# Patient Record
Sex: MALE | Race: BLACK OR AFRICAN AMERICAN | Employment: OTHER | URBAN - METROPOLITAN AREA
[De-identification: names, ages, dates, MRNs, and addresses within clinical notes are randomized per-mention and may not be internally consistent; named-entity substitution may affect disease eponyms.]

---

## 2019-02-12 ENCOUNTER — HOSPITAL ENCOUNTER (OUTPATIENT)
Age: 74
Setting detail: OUTPATIENT SURGERY
Discharge: HOME | End: 2019-02-12
Attending: SPECIALIST | Admitting: SPECIALIST
Payer: MEDICARE

## 2019-02-12 ENCOUNTER — ANESTHESIA (OUTPATIENT)
Dept: ENDOSCOPY | Age: 74
End: 2019-02-12
Payer: MEDICARE

## 2019-02-12 ENCOUNTER — ANESTHESIA EVENT (OUTPATIENT)
Dept: ENDOSCOPY | Age: 74
End: 2019-02-12
Payer: MEDICARE

## 2019-02-12 VITALS
BODY MASS INDEX: 24.21 KG/M2 | WEIGHT: 150 LBS | RESPIRATION RATE: 24 BRPM | HEART RATE: 55 BPM | DIASTOLIC BLOOD PRESSURE: 82 MMHG | SYSTOLIC BLOOD PRESSURE: 144 MMHG | TEMPERATURE: 98.2 F | OXYGEN SATURATION: 98 %

## 2019-02-12 PROCEDURE — 76060000032 HC ANESTHESIA 0.5 TO 1 HR: Performed by: SPECIALIST

## 2019-02-12 PROCEDURE — 74011250636 HC RX REV CODE- 250/636

## 2019-02-12 PROCEDURE — 76040000007: Performed by: SPECIALIST

## 2019-02-12 PROCEDURE — 77030027957 HC TBNG IRR ENDOGTR BUSS -B: Performed by: SPECIALIST

## 2019-02-12 RX ORDER — PROPOFOL 10 MG/ML
INJECTION, EMULSION INTRAVENOUS AS NEEDED
Status: DISCONTINUED | OUTPATIENT
Start: 2019-02-12 | End: 2019-02-12 | Stop reason: HOSPADM

## 2019-02-12 RX ORDER — EPINEPHRINE 0.1 MG/ML
1 INJECTION INTRACARDIAC; INTRAVENOUS
Status: DISCONTINUED | OUTPATIENT
Start: 2019-02-12 | End: 2019-02-12 | Stop reason: HOSPADM

## 2019-02-12 RX ORDER — NALOXONE HYDROCHLORIDE 0.4 MG/ML
0.4 INJECTION, SOLUTION INTRAMUSCULAR; INTRAVENOUS; SUBCUTANEOUS
Status: DISCONTINUED | OUTPATIENT
Start: 2019-02-12 | End: 2019-02-12 | Stop reason: HOSPADM

## 2019-02-12 RX ORDER — FENTANYL CITRATE 50 UG/ML
200 INJECTION, SOLUTION INTRAMUSCULAR; INTRAVENOUS
Status: DISCONTINUED | OUTPATIENT
Start: 2019-02-12 | End: 2019-02-12 | Stop reason: HOSPADM

## 2019-02-12 RX ORDER — SODIUM CHLORIDE 0.9 % (FLUSH) 0.9 %
5-40 SYRINGE (ML) INJECTION AS NEEDED
Status: DISCONTINUED | OUTPATIENT
Start: 2019-02-12 | End: 2019-02-12 | Stop reason: HOSPADM

## 2019-02-12 RX ORDER — MIDAZOLAM HYDROCHLORIDE 1 MG/ML
.25-1 INJECTION, SOLUTION INTRAMUSCULAR; INTRAVENOUS
Status: DISCONTINUED | OUTPATIENT
Start: 2019-02-12 | End: 2019-02-12 | Stop reason: HOSPADM

## 2019-02-12 RX ORDER — FLUMAZENIL 0.1 MG/ML
0.2 INJECTION INTRAVENOUS
Status: DISCONTINUED | OUTPATIENT
Start: 2019-02-12 | End: 2019-02-12 | Stop reason: HOSPADM

## 2019-02-12 RX ORDER — DEXTROMETHORPHAN/PSEUDOEPHED 2.5-7.5/.8
1.2 DROPS ORAL
Status: DISCONTINUED | OUTPATIENT
Start: 2019-02-12 | End: 2019-02-12 | Stop reason: HOSPADM

## 2019-02-12 RX ORDER — SODIUM CHLORIDE 0.9 % (FLUSH) 0.9 %
5-40 SYRINGE (ML) INJECTION EVERY 8 HOURS
Status: DISCONTINUED | OUTPATIENT
Start: 2019-02-12 | End: 2019-02-12 | Stop reason: HOSPADM

## 2019-02-12 RX ORDER — SODIUM CHLORIDE 9 MG/ML
INJECTION, SOLUTION INTRAVENOUS
Status: DISCONTINUED | OUTPATIENT
Start: 2019-02-12 | End: 2019-02-12 | Stop reason: HOSPADM

## 2019-02-12 RX ORDER — LIDOCAINE HYDROCHLORIDE 20 MG/ML
INJECTION, SOLUTION INFILTRATION; PERINEURAL AS NEEDED
Status: DISCONTINUED | OUTPATIENT
Start: 2019-02-12 | End: 2019-02-12 | Stop reason: HOSPADM

## 2019-02-12 RX ORDER — ATROPINE SULFATE 0.1 MG/ML
0.5 INJECTION INTRAVENOUS
Status: DISCONTINUED | OUTPATIENT
Start: 2019-02-12 | End: 2019-02-12 | Stop reason: HOSPADM

## 2019-02-12 RX ORDER — SODIUM CHLORIDE 9 MG/ML
50 INJECTION, SOLUTION INTRAVENOUS CONTINUOUS
Status: DISCONTINUED | OUTPATIENT
Start: 2019-02-12 | End: 2019-02-12 | Stop reason: HOSPADM

## 2019-02-12 RX ADMIN — PROPOFOL 20 MG: 10 INJECTION, EMULSION INTRAVENOUS at 09:48

## 2019-02-12 RX ADMIN — PROPOFOL 50 MG: 10 INJECTION, EMULSION INTRAVENOUS at 09:40

## 2019-02-12 RX ADMIN — PROPOFOL 20 MG: 10 INJECTION, EMULSION INTRAVENOUS at 09:52

## 2019-02-12 RX ADMIN — LIDOCAINE HYDROCHLORIDE 60 MG: 20 INJECTION, SOLUTION INFILTRATION; PERINEURAL at 09:40

## 2019-02-12 RX ADMIN — PROPOFOL 20 MG: 10 INJECTION, EMULSION INTRAVENOUS at 09:56

## 2019-02-12 RX ADMIN — PROPOFOL 20 MG: 10 INJECTION, EMULSION INTRAVENOUS at 09:57

## 2019-02-12 RX ADMIN — PROPOFOL 30 MG: 10 INJECTION, EMULSION INTRAVENOUS at 09:43

## 2019-02-12 RX ADMIN — PROPOFOL 30 MG: 10 INJECTION, EMULSION INTRAVENOUS at 09:44

## 2019-02-12 RX ADMIN — SODIUM CHLORIDE: 9 INJECTION, SOLUTION INTRAVENOUS at 09:26

## 2019-02-12 RX ADMIN — PROPOFOL 50 MG: 10 INJECTION, EMULSION INTRAVENOUS at 09:41

## 2019-02-12 RX ADMIN — PROPOFOL 20 MG: 10 INJECTION, EMULSION INTRAVENOUS at 09:51

## 2019-02-12 RX ADMIN — PROPOFOL 20 MG: 10 INJECTION, EMULSION INTRAVENOUS at 09:53

## 2019-02-12 RX ADMIN — PROPOFOL 20 MG: 10 INJECTION, EMULSION INTRAVENOUS at 09:47

## 2019-02-12 RX ADMIN — PROPOFOL 50 MG: 10 INJECTION, EMULSION INTRAVENOUS at 09:46

## 2019-02-12 RX ADMIN — PROPOFOL 20 MG: 10 INJECTION, EMULSION INTRAVENOUS at 09:50

## 2019-02-12 RX ADMIN — PROPOFOL 20 MG: 10 INJECTION, EMULSION INTRAVENOUS at 09:49

## 2019-02-12 RX ADMIN — PROPOFOL 50 MG: 10 INJECTION, EMULSION INTRAVENOUS at 09:42

## 2019-02-12 NOTE — DISCHARGE INSTRUCTIONS
[facility]  [id number]  1945    COLON DISCHARGE INSTRUCTIONS  Discomfort:  Redness at IV site- apply warm compress to area; if redness or soreness persist- contact your physician  There may be a slight amount of blood passed from the rectum  Gaseous discomfort- walking, belching will help relieve any discomfort  You may not operate a vehicle for 12 hours  You may not engage in an occupation involving machinery or appliances for rest of today  You may not drink alcoholic beverages for at least 12 hours  Avoid making any critical decisions for at least 24 hour  DIET:   High fiber diet. - however -  remember your colon is empty and a heavy meal will produce gas. Avoid these foods:  vegetables, fried / greasy foods, carbonated drinks for today. MEDICATIONS:      Regarding Aspirin or Nonsteroidal medications, please see below. ACTIVITY:  You may resume your normal daily activities it is recommended that you spend the remainder of the day resting -  avoid any strenuous activity. CALL M.D. ANY SIGN OF:  Increasing pain, nausea, vomiting  Abdominal distension (swelling)  New increased bleeding (oral or rectal)  Fever (chills)  Pain in chest area  Bloody discharge from nose or mouth  Shortness of breath    You may  take any Advil, Aspirin, Ibuprofen, Motrin, Aleve, or Goodys for 10 days, ONLY  Tylenol as needed for pain.       Follow-up Instructions:   Call Dr. Steven Martinez office if you have blood in stools or severe pain    Telephone #  [phone]      DISCHARGE SUMMARY from Nurse    The following personal items collected during your admission are returned to you:   Dental Appliance: Dental Appliances: None  Vision: Visual Aid: Glasses  Hearing Aid:    Jewelry:    Clothing:    Other Valuables:    Valuables sent to safe:

## 2019-02-12 NOTE — ANESTHESIA PREPROCEDURE EVALUATION
Anesthetic History No history of anesthetic complications Review of Systems / Medical History Patient summary reviewed, nursing notes reviewed and pertinent labs reviewed Pulmonary Within defined limits Neuro/Psych Within defined limits Cardiovascular Hypertension CAD and CABG 
 
 
  
GI/Hepatic/Renal 
  
 
 
 
 
 
Comments: Hx polyps Endo/Other Other Findings Physical Exam 
 
Airway Mallampati: I 
TM Distance: > 6 cm Neck ROM: normal range of motion Mouth opening: Normal 
 
 Cardiovascular Rhythm: regular Rate: normal 
 
 
 
 Dental 
No notable dental hx Pulmonary Breath sounds clear to auscultation Abdominal 
 
 
 
 Other Findings Anesthetic Plan ASA: 3 Anesthesia type: MAC Induction: Intravenous Anesthetic plan and risks discussed with: Patient

## 2019-02-12 NOTE — H&P
Colonoscopy History and Physical      The patient was seen and examined. Date of last colonoscopy: 2015, Polyps  Yes      The airway was assessed and documented. The problem list, past medical history, and medications were reviewed. There is no problem list on file for this patient. Social History     Socioeconomic History    Marital status:      Spouse name: Not on file    Number of children: Not on file    Years of education: Not on file    Highest education level: Not on file   Social Needs    Financial resource strain: Not on file    Food insecurity - worry: Not on file    Food insecurity - inability: Not on file    Transportation needs - medical: Not on file   Yozio needs - non-medical: Not on file   Occupational History    Not on file   Tobacco Use    Smoking status: Former Smoker     Years: 25.00     Last attempt to quit: 2/12/2016     Years since quitting: 3.0    Tobacco comment: quit smoking cigarettes 1 1/2 yrs ago   Substance and Sexual Activity    Alcohol use: No    Drug use: No    Sexual activity: Not on file   Other Topics Concern    Not on file   Social History Narrative    Not on file     Past Medical History:   Diagnosis Date    CAD (coronary artery disease)     [facility] [address] [facility]     Heart attack ([facility]     Hypertension          Prior to Admission Medications   Prescriptions Last Dose Informant Patient Reported? Taking? OTHER 2/12/2019 at Unknown time  Yes Yes   aspirin 81 mg tablet 2/11/2019 at Unknown time  Yes Yes   Sig: Take 81 mg by mouth.   metoprolol (LOPRESSOR) 50 mg tablet 2/11/2019 at Unknown time  Yes Yes   Sig: Take 50 mg by mouth two (2) times a day. pravastatin (PRAVACHOL) 10 mg tablet 2/11/2019 at Unknown time  Yes Yes   Sig: Take 40 mg by mouth nightly. Facility-Administered Medications: None       The patient was seen and examined in the endoscopy suite. The airway was assessed and docuemented.  The problem list and medications were reviewed. Chief complaint, history of present illness, and review of systems and Past medical History are positive for: Colon polyps, CAD and COPD    The heart, lungs and mental status were satisfactory for the administration of sedation and for the procedure. I discussed with the patient the objectives, risks, consequences and alternatives to the procedure.      Plan: Endoscopic procedure with sedation     Brittani Mata MD   2/12/2019  9:34 AM

## 2019-02-12 NOTE — PERIOP NOTES

## 2019-02-12 NOTE — PROCEDURES
[address]  [address]                 Colonoscopy Procedure Note    Indications:   See Preoperative Diagnosis above  Referring Physician: Curtis Eason MD  Anesthesia/Sedation: MAC anesthesia Propofol  Endoscopist:  Dr. Laya Nelson  Assistant:  Endoscopy Technician-1: Suly Luevano  Endoscopy RN-1: Apryl Mahan RN  Preoperative diagnosis: PERSONAL HX OF COLONIC POLYPS  Postoperative diagnosis: Diverticulosis    Procedure in Detail:  Informed consent was obtained for the procedure, including sedation. Risks of perforation, hemorrhage, adverse drug reaction, and aspiration were discussed. The patient was placed in the left lateral decubitus position. Based on the pre-procedure assessment, including review of the patient's medical history, medications, allergies, and review of systems, he had been deemed to be an appropriate candidate for moderate sedation; he was therefore sedated with the medications listed above. The patient was monitored continuously with ECG tracing, pulse oximetry, blood pressure monitoring, and direct observations. A rectal examination was performed. The [id number] was inserted into the rectum and advanced under direct vision to the cecum, which was identified by the ileocecal valve and appendiceal orifice. The quality of the colonic preparation was fair. A careful inspection was made as the colonoscope was withdrawn, including a retroflexed view of the rectum; findings and interventions are described below. Appropriate photodocumentation was obtained. Findings:  Rectum: normal  Sigmoid: moderate diverticulosis; Descending Colon: moderate diverticulosis;  Transverse Colon: normal  Ascending Colon: normal  Cecum: normal      Specimens:    none    EBL: None    Complications: None; patient tolerated the procedure well. Recommendations:     - Repeat colonoscopy in 5 years. - High fiber diet.       Signed By: Angelita Moreno Gris Aguilera MD                        February 12, 2019

## 2019-02-12 NOTE — PROGRESS NOTES
Ana Velasquez  1945  [id number]    Situation:  Verbal report received from: Elbanat Alberto  Procedure: Procedure(s):  COLONOSCOPY    Background:    Preoperative diagnosis: PERSONAL HX OF COLONIC POLYPS  Postoperative diagnosis: Diverticulosis    :  Dr. Juju Serrano  Assistant(s): Endoscopy Technician-1: Patricia King  Endoscopy RN-1: Jerad Chapman RN    Specimens: * No specimens in log *  H. Pylori  no    Assessment:  Intra-procedure medications   Anesthesia gave intra-procedure sedation and medications, see anesthesia flow sheet yes    Intravenous fluids: NS@ KVO     Vital signs stable     Abdominal assessment: round and soft     Recommendation:  Discharge patient per MD order    Family or Friend   Permission to share finding with family or friend yes

## 2019-03-20 NOTE — ANESTHESIA POSTPROCEDURE EVALUATION
Post-Anesthesia Evaluation and Assessment Patient: Jeannette Gallegos MRN: [id number]  SSN: [id number] YOB: 1945  Age: 75 y.o. Sex: male I have evaluated the patient and they are stable and ready for discharge from the PACU. Cardiovascular Function/Vital Signs Visit Vitals /82 Pulse (!) 55 Temp 36.8 °C (98.2 °F) Resp 24 Wt 68 kg (150 lb) SpO2 98% BMI 24.21 kg/m² Patient is status post MAC anesthesia for Procedure(s): 
COLONOSCOPY. Nausea/Vomiting: None Postoperative hydration reviewed and adequate. Pain: 
Pain Scale 1: Visual (02/12/19 1017) Pain Intensity 1: 0 (02/12/19 0925) Managed Neurological Status: At baseline Mental Status, Level of Consciousness: Alert and  oriented to person, place, and time Pulmonary Status:  
O2 Device: Room air (02/12/19 1015) Adequate oxygenation and airway patent Complications related to anesthesia: None Post-anesthesia assessment completed. No concerns Signed By: Tresa Jordan MD   
 February 12, 2019 Procedure(s): 
COLONOSCOPY. 
 
<BSHSIANPOST> Visit Vitals /82 Pulse (!) 55 Temp 36.8 °C (98.2 °F) Resp 24 Wt 68 kg (150 lb) SpO2 98% BMI 24.21 kg/m² None

## 2019-08-29 ENCOUNTER — HOSPITAL ENCOUNTER (EMERGENCY)
Age: 74
Discharge: HOME | End: 2019-08-29
Attending: EMERGENCY MEDICINE
Payer: MEDICARE

## 2019-08-29 VITALS
BODY MASS INDEX: 22.68 KG/M2 | RESPIRATION RATE: 16 BRPM | HEIGHT: 66 IN | WEIGHT: 141.09 LBS | DIASTOLIC BLOOD PRESSURE: 98 MMHG | TEMPERATURE: 98.2 F | SYSTOLIC BLOOD PRESSURE: 156 MMHG | HEART RATE: 57 BPM | OXYGEN SATURATION: 98 %

## 2019-08-29 DIAGNOSIS — T63.441A BEE STING REACTION, ACCIDENTAL OR UNINTENTIONAL, INITIAL ENCOUNTER: Primary | ICD-10-CM

## 2019-08-29 PROCEDURE — 74011250637 HC RX REV CODE- 250/637: Performed by: PHYSICIAN ASSISTANT

## 2019-08-29 PROCEDURE — 74011636637 HC RX REV CODE- 636/637: Performed by: PHYSICIAN ASSISTANT

## 2019-08-29 PROCEDURE — 99284 EMERGENCY DEPT VISIT MOD MDM: CPT

## 2019-08-29 RX ORDER — IBUPROFEN 600 MG/1
600 TABLET ORAL
Status: COMPLETED | OUTPATIENT
Start: 2019-08-29 | End: 2019-08-29

## 2019-08-29 RX ORDER — CETIRIZINE HCL 10 MG
10 TABLET ORAL
Status: COMPLETED | OUTPATIENT
Start: 2019-08-29 | End: 2019-08-29

## 2019-08-29 RX ORDER — IBUPROFEN 600 MG/1
600 TABLET ORAL
Qty: 20 TAB | Refills: 0 | Status: SHIPPED | OUTPATIENT
Start: 2019-08-29 | End: 2021-10-24

## 2019-08-29 RX ORDER — PREDNISONE 20 MG/1
60 TABLET ORAL
Status: COMPLETED | OUTPATIENT
Start: 2019-08-29 | End: 2019-08-29

## 2019-08-29 RX ORDER — CETIRIZINE HCL 10 MG
10 TABLET ORAL DAILY
Qty: 10 TAB | Refills: 0 | Status: SHIPPED | OUTPATIENT
Start: 2019-08-29 | End: 2021-10-25

## 2019-08-29 RX ORDER — PREDNISONE 10 MG/1
TABLET ORAL
Qty: 21 TAB | Refills: 0 | Status: SHIPPED | OUTPATIENT
Start: 2019-08-29 | End: 2021-10-24

## 2019-08-29 RX ADMIN — CETIRIZINE HYDROCHLORIDE 10 MG: 10 TABLET, FILM COATED ORAL at 21:57

## 2019-08-29 RX ADMIN — PREDNISONE 60 MG: 20 TABLET ORAL at 21:57

## 2019-08-29 RX ADMIN — IBUPROFEN 600 MG: 600 TABLET, FILM COATED ORAL at 21:57

## 2019-08-29 NOTE — LETTER
[address] 
[facility] EMERGENCY DEPT 
[address] [place] [address] Aleda Primrose [id number] [phone] Work/School Note Date: 8/29/2019 To Whom It May concern: 
 
Bia Staley was seen and treated today in the emergency room by the following provider(s): 
Attending Provider: Delvis Mark MD 
Physician Assistant: ADAM Torres. Bia Staley may return to work on [id number]. Sincerely, ADAM Weber
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
## 2021-10-24 ENCOUNTER — APPOINTMENT (OUTPATIENT)
Dept: GENERAL RADIOLOGY | Age: 76
DRG: 193 | End: 2021-10-24
Attending: EMERGENCY MEDICINE
Payer: MEDICARE

## 2021-10-24 ENCOUNTER — HOSPITAL ENCOUNTER (INPATIENT)
Age: 76
LOS: 3 days | Discharge: HOME | DRG: 193 | End: 2021-10-28
Attending: STUDENT IN AN ORGANIZED HEALTH CARE EDUCATION/TRAINING PROGRAM | Admitting: INTERNAL MEDICINE
Payer: MEDICARE

## 2021-10-24 DIAGNOSIS — J12.9 VIRAL PNEUMONIA: ICD-10-CM

## 2021-10-24 DIAGNOSIS — J44.1 COPD EXACERBATION (HCC): Primary | ICD-10-CM

## 2021-10-24 LAB
COMMENT, HOLDF: NORMAL
SAMPLES BEING HELD,HOLD: NORMAL

## 2021-10-24 PROCEDURE — 36415 COLL VENOUS BLD VENIPUNCTURE: CPT

## 2021-10-24 PROCEDURE — 71046 X-RAY EXAM CHEST 2 VIEWS: CPT

## 2021-10-24 PROCEDURE — 94640 AIRWAY INHALATION TREATMENT: CPT

## 2021-10-24 PROCEDURE — 96375 TX/PRO/DX INJ NEW DRUG ADDON: CPT

## 2021-10-24 PROCEDURE — 74011000250 HC RX REV CODE- 250: Performed by: STUDENT IN AN ORGANIZED HEALTH CARE EDUCATION/TRAINING PROGRAM

## 2021-10-24 PROCEDURE — 80053 COMPREHEN METABOLIC PANEL: CPT

## 2021-10-24 PROCEDURE — 83880 ASSAY OF NATRIURETIC PEPTIDE: CPT

## 2021-10-24 PROCEDURE — 82550 ASSAY OF CK (CPK): CPT

## 2021-10-24 PROCEDURE — 85025 COMPLETE CBC W/AUTO DIFF WBC: CPT

## 2021-10-24 PROCEDURE — 99285 EMERGENCY DEPT VISIT HI MDM: CPT

## 2021-10-24 PROCEDURE — 74011250636 HC RX REV CODE- 250/636: Performed by: STUDENT IN AN ORGANIZED HEALTH CARE EDUCATION/TRAINING PROGRAM

## 2021-10-24 PROCEDURE — 82553 CREATINE MB FRACTION: CPT

## 2021-10-24 PROCEDURE — 93005 ELECTROCARDIOGRAM TRACING: CPT

## 2021-10-24 PROCEDURE — 84484 ASSAY OF TROPONIN QUANT: CPT

## 2021-10-24 RX ORDER — TERAZOSIN 5 MG/1
5 CAPSULE ORAL
COMMUNITY

## 2021-10-24 RX ORDER — HYDRALAZINE HYDROCHLORIDE 25 MG/1
25 TABLET, FILM COATED ORAL 2 TIMES DAILY
Status: ON HOLD | COMMUNITY
End: 2021-10-28 | Stop reason: SDUPTHER

## 2021-10-24 RX ORDER — IPRATROPIUM BROMIDE AND ALBUTEROL SULFATE 2.5; .5 MG/3ML; MG/3ML
3 SOLUTION RESPIRATORY (INHALATION)
Status: COMPLETED | OUTPATIENT
Start: 2021-10-24 | End: 2021-10-25

## 2021-10-24 RX ADMIN — IPRATROPIUM BROMIDE AND ALBUTEROL SULFATE 3 ML: .5; 3 SOLUTION RESPIRATORY (INHALATION) at 23:53

## 2021-10-24 RX ADMIN — METHYLPREDNISOLONE SODIUM SUCCINATE 125 MG: 125 INJECTION, POWDER, FOR SOLUTION INTRAMUSCULAR; INTRAVENOUS at 23:57

## 2021-10-24 NOTE — Clinical Note
Patient Class[de-identified] OBSERVATION [104]   Type of Bed: Medical [8]   Cardiac Monitoring Required?: No   Reason for Observation: COPD exa   Admitting Diagnosis: [facility] exacerbation ([facility] [2370235]   Admitting Diagnosis: Acute respiratory failure with hypoxia [facility]) [[id number]]   Admitting Physician: Deep Choi   Attending Physician: Deep Miller [[id number]]

## 2021-10-25 PROBLEM — J44.1 COPD EXACERBATION (HCC): Status: ACTIVE | Noted: 2021-10-25

## 2021-10-25 PROBLEM — J96.01 ACUTE RESPIRATORY FAILURE WITH HYPOXIA (HCC): Status: ACTIVE | Noted: 2021-10-25

## 2021-10-25 PROBLEM — R09.02 HYPOXIA: Status: ACTIVE | Noted: 2021-10-25

## 2021-10-25 LAB
ALBUMIN SERPL-MCNC: 3 G/DL (ref 3.5–5)
ALBUMIN SERPL-MCNC: 3 G/DL (ref 3.5–5)
ALBUMIN/GLOB SERPL: 0.6 {RATIO} (ref 1.1–2.2)
ALBUMIN/GLOB SERPL: 0.7 {RATIO} (ref 1.1–2.2)
ALP SERPL-CCNC: 65 U/L (ref 45–117)
ALP SERPL-CCNC: 70 U/L (ref 45–117)
ALT SERPL-CCNC: 20 U/L (ref 12–78)
ALT SERPL-CCNC: 20 U/L (ref 12–78)
ANION GAP SERPL CALC-SCNC: 1 MMOL/L (ref 5–15)
ANION GAP SERPL CALC-SCNC: 2 MMOL/L (ref 5–15)
AST SERPL-CCNC: 25 U/L (ref 15–37)
AST SERPL-CCNC: 28 U/L (ref 15–37)
ATRIAL RATE: 60 BPM
B PERT DNA SPEC QL NAA+PROBE: NOT DETECTED
BASOPHILS # BLD: 0 K/UL (ref 0–0.1)
BASOPHILS # BLD: 0 K/UL (ref 0–0.1)
BASOPHILS NFR BLD: 0 % (ref 0–1)
BASOPHILS NFR BLD: 1 % (ref 0–1)
BILIRUB SERPL-MCNC: 0.4 MG/DL (ref 0.2–1)
BILIRUB SERPL-MCNC: 0.4 MG/DL (ref 0.2–1)
BNP SERPL-MCNC: 2176 PG/ML
BORDETELLA PARAPERTUSSIS PCR, BORPAR: NOT DETECTED
BUN SERPL-MCNC: 12 MG/DL (ref 6–20)
BUN SERPL-MCNC: 14 MG/DL (ref 6–20)
BUN/CREAT SERPL: 11 (ref 12–20)
BUN/CREAT SERPL: 12 (ref 12–20)
C PNEUM DNA SPEC QL NAA+PROBE: NOT DETECTED
CALCIUM SERPL-MCNC: 8.5 MG/DL (ref 8.5–10.1)
CALCIUM SERPL-MCNC: 8.7 MG/DL (ref 8.5–10.1)
CALCULATED P AXIS, ECG09: 38 DEGREES
CALCULATED R AXIS, ECG10: 6 DEGREES
CALCULATED T AXIS, ECG11: 70 DEGREES
CHLORIDE SERPL-SCNC: 108 MMOL/L (ref 97–108)
CHLORIDE SERPL-SCNC: 108 MMOL/L (ref 97–108)
CK MB CFR SERPL CALC: 1.4 % (ref 0–2.5)
CK MB SERPL-MCNC: 4.8 NG/ML (ref 5–25)
CK SERPL-CCNC: 334 U/L (ref 39–308)
CO2 SERPL-SCNC: 25 MMOL/L (ref 21–32)
CO2 SERPL-SCNC: 29 MMOL/L (ref 21–32)
COVID-19 RAPID TEST, COVR: NOT DETECTED
CREAT SERPL-MCNC: 1.06 MG/DL (ref 0.7–1.3)
CREAT SERPL-MCNC: 1.13 MG/DL (ref 0.7–1.3)
DIAGNOSIS, 93000: NORMAL
DIFFERENTIAL METHOD BLD: ABNORMAL
DIFFERENTIAL METHOD BLD: ABNORMAL
EOSINOPHIL # BLD: 0 K/UL (ref 0–0.4)
EOSINOPHIL # BLD: 0.1 K/UL (ref 0–0.4)
EOSINOPHIL NFR BLD: 0 % (ref 0–7)
EOSINOPHIL NFR BLD: 2 % (ref 0–7)
ERYTHROCYTE [DISTWIDTH] IN BLOOD BY AUTOMATED COUNT: 14.3 % (ref 11.5–14.5)
ERYTHROCYTE [DISTWIDTH] IN BLOOD BY AUTOMATED COUNT: 14.5 % (ref 11.5–14.5)
FLUAV H1 2009 PAND RNA SPEC QL NAA+PROBE: NOT DETECTED
FLUAV H1 RNA SPEC QL NAA+PROBE: NOT DETECTED
FLUAV H3 RNA SPEC QL NAA+PROBE: NOT DETECTED
FLUAV SUBTYP SPEC NAA+PROBE: NOT DETECTED
FLUBV RNA SPEC QL NAA+PROBE: NOT DETECTED
GLOBULIN SER CALC-MCNC: 4.6 G/DL (ref 2–4)
GLOBULIN SER CALC-MCNC: 5 G/DL (ref 2–4)
GLUCOSE SERPL-MCNC: 128 MG/DL (ref 65–100)
GLUCOSE SERPL-MCNC: 95 MG/DL (ref 65–100)
HADV DNA SPEC QL NAA+PROBE: NOT DETECTED
HCOV 229E RNA SPEC QL NAA+PROBE: NOT DETECTED
HCOV HKU1 RNA SPEC QL NAA+PROBE: NOT DETECTED
HCOV NL63 RNA SPEC QL NAA+PROBE: NOT DETECTED
HCOV OC43 RNA SPEC QL NAA+PROBE: NOT DETECTED
HCT VFR BLD AUTO: 38.7 % (ref 36.6–50.3)
HCT VFR BLD AUTO: 38.9 % (ref 36.6–50.3)
HGB BLD-MCNC: 12.1 G/DL (ref 12.1–17)
HGB BLD-MCNC: 12.2 G/DL (ref 12.1–17)
HMPV RNA SPEC QL NAA+PROBE: NOT DETECTED
HPIV1 RNA SPEC QL NAA+PROBE: NOT DETECTED
HPIV2 RNA SPEC QL NAA+PROBE: NOT DETECTED
HPIV3 RNA SPEC QL NAA+PROBE: NOT DETECTED
HPIV4 RNA SPEC QL NAA+PROBE: NOT DETECTED
IMM GRANULOCYTES # BLD AUTO: 0 K/UL (ref 0–0.04)
IMM GRANULOCYTES # BLD AUTO: 0 K/UL (ref 0–0.04)
IMM GRANULOCYTES NFR BLD AUTO: 0 % (ref 0–0.5)
IMM GRANULOCYTES NFR BLD AUTO: 0 % (ref 0–0.5)
LYMPHOCYTES # BLD: 0.4 K/UL (ref 0.8–3.5)
LYMPHOCYTES # BLD: 1.2 K/UL (ref 0.8–3.5)
LYMPHOCYTES NFR BLD: 12 % (ref 12–49)
LYMPHOCYTES NFR BLD: 29 % (ref 12–49)
M PNEUMO DNA SPEC QL NAA+PROBE: NOT DETECTED
MCH RBC QN AUTO: 30 PG (ref 26–34)
MCH RBC QN AUTO: 30.7 PG (ref 26–34)
MCHC RBC AUTO-ENTMCNC: 31.1 G/DL (ref 30–36.5)
MCHC RBC AUTO-ENTMCNC: 31.5 G/DL (ref 30–36.5)
MCV RBC AUTO: 96.5 FL (ref 80–99)
MCV RBC AUTO: 97.2 FL (ref 80–99)
MONOCYTES # BLD: 0 K/UL (ref 0–1)
MONOCYTES # BLD: 0.3 K/UL (ref 0–1)
MONOCYTES NFR BLD: 1 % (ref 5–13)
MONOCYTES NFR BLD: 7 % (ref 5–13)
NEUTS SEG # BLD: 2.4 K/UL (ref 1.8–8)
NEUTS SEG # BLD: 2.9 K/UL (ref 1.8–8)
NEUTS SEG NFR BLD: 61 % (ref 32–75)
NEUTS SEG NFR BLD: 87 % (ref 32–75)
NRBC # BLD: 0 K/UL (ref 0–0.01)
NRBC # BLD: 0 K/UL (ref 0–0.01)
NRBC BLD-RTO: 0 PER 100 WBC
NRBC BLD-RTO: 0 PER 100 WBC
P-R INTERVAL, ECG05: 182 MS
PLATELET # BLD AUTO: 89 K/UL (ref 150–400)
PLATELET # BLD AUTO: 93 K/UL (ref 150–400)
PMV BLD AUTO: 10.1 FL (ref 8.9–12.9)
PMV BLD AUTO: 10.1 FL (ref 8.9–12.9)
POTASSIUM SERPL-SCNC: 3.9 MMOL/L (ref 3.5–5.1)
POTASSIUM SERPL-SCNC: 4.3 MMOL/L (ref 3.5–5.1)
PROT SERPL-MCNC: 7.6 G/DL (ref 6.4–8.2)
PROT SERPL-MCNC: 8 G/DL (ref 6.4–8.2)
Q-T INTERVAL, ECG07: 444 MS
QRS DURATION, ECG06: 92 MS
QTC CALCULATION (BEZET), ECG08: 444 MS
RBC # BLD AUTO: 3.98 M/UL (ref 4.1–5.7)
RBC # BLD AUTO: 4.03 M/UL (ref 4.1–5.7)
RBC MORPH BLD: ABNORMAL
RSV RNA SPEC QL NAA+PROBE: NOT DETECTED
RV+EV RNA SPEC QL NAA+PROBE: DETECTED
SARS-COV-2 PCR, COVPCR: NOT DETECTED
SODIUM SERPL-SCNC: 135 MMOL/L (ref 136–145)
SODIUM SERPL-SCNC: 138 MMOL/L (ref 136–145)
SOURCE, COVRS: NORMAL
TROPONIN-HIGH SENSITIVITY: 19 NG/L (ref 0–76)
VENTRICULAR RATE, ECG03: 60 BPM
WBC # BLD AUTO: 3.3 K/UL (ref 4.1–11.1)
WBC # BLD AUTO: 4 K/UL (ref 4.1–11.1)

## 2021-10-25 PROCEDURE — 36415 COLL VENOUS BLD VENIPUNCTURE: CPT

## 2021-10-25 PROCEDURE — 74011000250 HC RX REV CODE- 250: Performed by: GENERAL ACUTE CARE HOSPITAL

## 2021-10-25 PROCEDURE — 87635 SARS-COV-2 COVID-19 AMP PRB: CPT

## 2021-10-25 PROCEDURE — 94640 AIRWAY INHALATION TREATMENT: CPT

## 2021-10-25 PROCEDURE — 80053 COMPREHEN METABOLIC PANEL: CPT

## 2021-10-25 PROCEDURE — 65660000000 HC RM CCU STEPDOWN

## 2021-10-25 PROCEDURE — 74011000258 HC RX REV CODE- 258: Performed by: GENERAL ACUTE CARE HOSPITAL

## 2021-10-25 PROCEDURE — 74011250637 HC RX REV CODE- 250/637: Performed by: INTERNAL MEDICINE

## 2021-10-25 PROCEDURE — 87449 NOS EACH ORGANISM AG IA: CPT

## 2021-10-25 PROCEDURE — 0202U NFCT DS 22 TRGT SARS-COV-2: CPT

## 2021-10-25 PROCEDURE — 96365 THER/PROPH/DIAG IV INF INIT: CPT

## 2021-10-25 PROCEDURE — 74011250636 HC RX REV CODE- 250/636: Performed by: GENERAL ACUTE CARE HOSPITAL

## 2021-10-25 PROCEDURE — 74011250637 HC RX REV CODE- 250/637: Performed by: NURSE PRACTITIONER

## 2021-10-25 PROCEDURE — 85025 COMPLETE CBC W/AUTO DIFF WBC: CPT

## 2021-10-25 PROCEDURE — 96372 THER/PROPH/DIAG INJ SC/IM: CPT

## 2021-10-25 PROCEDURE — 74011250636 HC RX REV CODE- 250/636: Performed by: STUDENT IN AN ORGANIZED HEALTH CARE EDUCATION/TRAINING PROGRAM

## 2021-10-25 PROCEDURE — 99218 HC RM OBSERVATION: CPT

## 2021-10-25 PROCEDURE — 74011000250 HC RX REV CODE- 250: Performed by: STUDENT IN AN ORGANIZED HEALTH CARE EDUCATION/TRAINING PROGRAM

## 2021-10-25 PROCEDURE — 74011250637 HC RX REV CODE- 250/637: Performed by: GENERAL ACUTE CARE HOSPITAL

## 2021-10-25 RX ORDER — METOPROLOL TARTRATE 25 MG/1
25 TABLET, FILM COATED ORAL 2 TIMES DAILY
Status: DISCONTINUED | OUTPATIENT
Start: 2021-10-25 | End: 2021-10-28 | Stop reason: HOSPADM

## 2021-10-25 RX ORDER — PRAVASTATIN SODIUM 40 MG/1
40 TABLET ORAL
Status: DISCONTINUED | OUTPATIENT
Start: 2021-10-25 | End: 2021-10-28 | Stop reason: HOSPADM

## 2021-10-25 RX ORDER — PREDNISONE 20 MG/1
40 TABLET ORAL 2 TIMES DAILY WITH MEALS
Status: DISCONTINUED | OUTPATIENT
Start: 2021-10-25 | End: 2021-10-25

## 2021-10-25 RX ORDER — ACETAMINOPHEN 325 MG/1
650 TABLET ORAL
Status: DISCONTINUED | OUTPATIENT
Start: 2021-10-25 | End: 2021-10-28 | Stop reason: HOSPADM

## 2021-10-25 RX ORDER — TERAZOSIN 5 MG/1
5 CAPSULE ORAL
Status: DISCONTINUED | OUTPATIENT
Start: 2021-10-25 | End: 2021-10-28 | Stop reason: HOSPADM

## 2021-10-25 RX ORDER — CETIRIZINE HCL 10 MG
10 TABLET ORAL DAILY
Status: DISCONTINUED | OUTPATIENT
Start: 2021-10-25 | End: 2021-10-28 | Stop reason: HOSPADM

## 2021-10-25 RX ORDER — ACETAMINOPHEN 650 MG/1
650 SUPPOSITORY RECTAL
Status: DISCONTINUED | OUTPATIENT
Start: 2021-10-25 | End: 2021-10-28 | Stop reason: HOSPADM

## 2021-10-25 RX ORDER — ALBUTEROL SULFATE 90 UG/1
2 AEROSOL, METERED RESPIRATORY (INHALATION)
COMMUNITY

## 2021-10-25 RX ORDER — SODIUM CHLORIDE 0.9 % (FLUSH) 0.9 %
5-40 SYRINGE (ML) INJECTION AS NEEDED
Status: DISCONTINUED | OUTPATIENT
Start: 2021-10-25 | End: 2021-10-28 | Stop reason: HOSPADM

## 2021-10-25 RX ORDER — ENOXAPARIN SODIUM 100 MG/ML
40 INJECTION SUBCUTANEOUS DAILY
Status: DISCONTINUED | OUTPATIENT
Start: 2021-10-25 | End: 2021-10-28 | Stop reason: HOSPADM

## 2021-10-25 RX ORDER — ACETAMINOPHEN 325 MG/1
650 TABLET ORAL
Status: DISCONTINUED | OUTPATIENT
Start: 2021-10-25 | End: 2021-10-25 | Stop reason: SDUPTHER

## 2021-10-25 RX ORDER — METOPROLOL TARTRATE 50 MG/1
50 TABLET ORAL 2 TIMES DAILY
Status: DISCONTINUED | OUTPATIENT
Start: 2021-10-25 | End: 2021-10-25

## 2021-10-25 RX ORDER — ONDANSETRON 4 MG/1
4 TABLET, ORALLY DISINTEGRATING ORAL
Status: DISCONTINUED | OUTPATIENT
Start: 2021-10-25 | End: 2021-10-28 | Stop reason: HOSPADM

## 2021-10-25 RX ORDER — HYDRALAZINE HYDROCHLORIDE 50 MG/1
50 TABLET, FILM COATED ORAL 2 TIMES DAILY
Status: DISCONTINUED | OUTPATIENT
Start: 2021-10-25 | End: 2021-10-28 | Stop reason: HOSPADM

## 2021-10-25 RX ORDER — GUAIFENESIN 600 MG/1
600 TABLET, EXTENDED RELEASE ORAL EVERY 12 HOURS
Status: DISCONTINUED | OUTPATIENT
Start: 2021-10-25 | End: 2021-10-28 | Stop reason: HOSPADM

## 2021-10-25 RX ORDER — SODIUM CHLORIDE 0.9 % (FLUSH) 0.9 %
5-40 SYRINGE (ML) INJECTION EVERY 8 HOURS
Status: DISCONTINUED | OUTPATIENT
Start: 2021-10-25 | End: 2021-10-28 | Stop reason: HOSPADM

## 2021-10-25 RX ORDER — GUAIFENESIN 100 MG/5ML
200 SOLUTION ORAL
Status: DISCONTINUED | OUTPATIENT
Start: 2021-10-25 | End: 2021-10-28 | Stop reason: HOSPADM

## 2021-10-25 RX ORDER — ONDANSETRON 2 MG/ML
4 INJECTION INTRAMUSCULAR; INTRAVENOUS
Status: DISCONTINUED | OUTPATIENT
Start: 2021-10-25 | End: 2021-10-28 | Stop reason: HOSPADM

## 2021-10-25 RX ORDER — IPRATROPIUM BROMIDE AND ALBUTEROL SULFATE 2.5; .5 MG/3ML; MG/3ML
3 SOLUTION RESPIRATORY (INHALATION)
Status: DISCONTINUED | OUTPATIENT
Start: 2021-10-25 | End: 2021-10-26

## 2021-10-25 RX ORDER — POLYETHYLENE GLYCOL 3350 17 G/17G
17 POWDER, FOR SOLUTION ORAL DAILY PRN
Status: DISCONTINUED | OUTPATIENT
Start: 2021-10-25 | End: 2021-10-28 | Stop reason: HOSPADM

## 2021-10-25 RX ORDER — HYDRALAZINE HYDROCHLORIDE 50 MG/1
25 TABLET, FILM COATED ORAL 2 TIMES DAILY
Status: DISCONTINUED | OUTPATIENT
Start: 2021-10-25 | End: 2021-10-25

## 2021-10-25 RX ORDER — GUAIFENESIN 100 MG/5ML
81 LIQUID (ML) ORAL DAILY
Status: DISCONTINUED | OUTPATIENT
Start: 2021-10-25 | End: 2021-10-28 | Stop reason: HOSPADM

## 2021-10-25 RX ADMIN — TERAZOSIN HYDROCHLORIDE 5 MG: 5 CAPSULE ORAL at 03:52

## 2021-10-25 RX ADMIN — HYDRALAZINE HYDROCHLORIDE 50 MG: 50 TABLET, FILM COATED ORAL at 18:40

## 2021-10-25 RX ADMIN — ARFORMOTEROL TARTRATE: 15 SOLUTION RESPIRATORY (INHALATION) at 18:37

## 2021-10-25 RX ADMIN — IPRATROPIUM BROMIDE AND ALBUTEROL SULFATE 3 ML: .5; 3 SOLUTION RESPIRATORY (INHALATION) at 00:01

## 2021-10-25 RX ADMIN — GUAIFENESIN 600 MG: 600 TABLET ORAL at 23:13

## 2021-10-25 RX ADMIN — Medication 10 ML: at 23:21

## 2021-10-25 RX ADMIN — ARFORMOTEROL TARTRATE: 15 SOLUTION RESPIRATORY (INHALATION) at 09:55

## 2021-10-25 RX ADMIN — AZITHROMYCIN MONOHYDRATE 500 MG: 500 INJECTION, POWDER, LYOPHILIZED, FOR SOLUTION INTRAVENOUS at 01:41

## 2021-10-25 RX ADMIN — TERAZOSIN HYDROCHLORIDE 5 MG: 5 CAPSULE ORAL at 23:13

## 2021-10-25 RX ADMIN — PRAVASTATIN SODIUM 40 MG: 40 TABLET ORAL at 03:50

## 2021-10-25 RX ADMIN — DOXYCYCLINE 100 MG: 100 INJECTION, POWDER, LYOPHILIZED, FOR SOLUTION INTRAVENOUS at 03:50

## 2021-10-25 RX ADMIN — DOXYCYCLINE 100 MG: 100 INJECTION, POWDER, LYOPHILIZED, FOR SOLUTION INTRAVENOUS at 23:20

## 2021-10-25 RX ADMIN — Medication 10 ML: at 06:00

## 2021-10-25 RX ADMIN — IPRATROPIUM BROMIDE AND ALBUTEROL SULFATE 3 ML: .5; 3 SOLUTION RESPIRATORY (INHALATION) at 00:00

## 2021-10-25 RX ADMIN — IPRATROPIUM BROMIDE AND ALBUTEROL SULFATE 3 ML: .5; 3 SOLUTION RESPIRATORY (INHALATION) at 18:37

## 2021-10-25 RX ADMIN — ENOXAPARIN SODIUM 40 MG: 40 INJECTION SUBCUTANEOUS at 08:25

## 2021-10-25 RX ADMIN — Medication 10 ML: at 01:50

## 2021-10-25 RX ADMIN — CETIRIZINE HYDROCHLORIDE 10 MG: 10 TABLET, FILM COATED ORAL at 08:35

## 2021-10-25 RX ADMIN — HYDRALAZINE HYDROCHLORIDE 50 MG: 50 TABLET, FILM COATED ORAL at 08:25

## 2021-10-25 RX ADMIN — Medication 10 ML: at 23:14

## 2021-10-25 RX ADMIN — METHYLPREDNISOLONE SODIUM SUCCINATE 40 MG: 40 INJECTION, POWDER, FOR SOLUTION INTRAMUSCULAR; INTRAVENOUS at 06:42

## 2021-10-25 RX ADMIN — METHYLPREDNISOLONE SODIUM SUCCINATE 40 MG: 40 INJECTION, POWDER, FOR SOLUTION INTRAMUSCULAR; INTRAVENOUS at 23:14

## 2021-10-25 RX ADMIN — PRAVASTATIN SODIUM 40 MG: 40 TABLET ORAL at 23:13

## 2021-10-25 RX ADMIN — ASPIRIN 81 MG: 81 TABLET, CHEWABLE ORAL at 08:25

## 2021-10-25 RX ADMIN — CEFTRIAXONE 1 G: 1 INJECTION, POWDER, FOR SOLUTION INTRAMUSCULAR; INTRAVENOUS at 03:52

## 2021-10-25 RX ADMIN — METOPROLOL TARTRATE 25 MG: 25 TABLET, FILM COATED ORAL at 23:13

## 2021-10-25 NOTE — ROUTINE PROCESS
TRANSFER - IN REPORT:    Verbal report received from [facility]  being received from ER(unit) for routine progression of care      Report consisted of patients Situation, Background, Assessment and   Recommendations(SBAR). Information from the following report(s) SBAR, Kardex, ED Summary, Recent Results and Cardiac Rhythm SINUS DENISE was reviewed with the receiving nurse. Opportunity for questions and clarification was provided. Assessment completed upon patients arrival to unit and care assumed.      PT POSITIVE FOR RHINOVIRUS- ABLE TO HAVE VISITORS PER ER AND ER SAYS THEY CONFIRMED WITH INFECTION CONTROL
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
## 2021-10-25 NOTE — PROGRESS NOTES
Pharmacy Medication Reconciliation     The patient's child was interviewed regarding current PTA medication list, use and drug allergies. The patient's child was questioned regarding use of any other inhalers, topical products, over the counter medications, herbal medications, vitamin products or ophthalmic/nasal/otic medication use. Allergy Update: Penicillins and Sulfa (sulfonamide antibiotics)    Recommendations/Findings: The following amendments were made to the patient's active medication list on file at [address]:   1) Additions: albuterol, allegra    2) Deletions: cetirizine, other    3) Changes: none      Pertinent Findings:     Clarified PTA med list with patient's child. PTA medication list was corrected to the following:     Prior to Admission Medications   Prescriptions Last Dose Informant Taking? albuterol (PROVENTIL HFA, VENTOLIN HFA, PROAIR HFA) 90 mcg/actuation inhaler 10/24/2021 at Unknown time Child Yes   Sig: Take 2 Puffs by inhalation every four (4) hours as needed. aspirin 81 mg tablet 10/24/2021 at Unknown time Child Yes   Sig: Take 81 mg by mouth. fexofenadine HCl (ALLEGRA PO) 10/24/2021 at Unknown time Child Yes   Sig: Take 1 Tablet by mouth daily. hydrALAZINE (APRESOLINE) 25 mg tablet 10/24/2021 at Unknown time Child Yes   Sig: Take 25 mg by mouth two (2) times a day. metoprolol (LOPRESSOR) 50 mg tablet 10/24/2021 at Unknown time Child Yes   Sig: Take 50 mg by mouth two (2) times a day. pravastatin (PRAVACHOL) 10 mg tablet 10/24/2021 at Unknown time Child Yes   Sig: Take 40 mg by mouth nightly. terazosin (HYTRIN) 5 mg capsule 10/24/2021 at Unknown time Child Yes   Sig: Take 5 mg by mouth nightly. Facility-Administered Medications: None        Thank you,  Taiwo Rojo PharmD.  Candidate 2022

## 2021-10-25 NOTE — ED NOTES
Bedside and Verbal shift change report given to this RN (oncoming nurse) by Osiris Marrufo RN (offgoing nurse). Report included the following information SBAR.

## 2021-10-25 NOTE — ED NOTES
Patient is resting comfortably in stretcher at this time with side rails up and call bell within reach. Patient eating lunch at this time.

## 2021-10-25 NOTE — PROGRESS NOTES
Transition of Care Plan:    RUR: 9 %, LOW  Disposition: Home with family  Follow up appointments: PCP  DME needed: None  Transportation at Discharge: Daughter Servando Adame  to transport   Keys or means to access home:   Yes     IM Medicare Letter: 2nd IM letter before d/c  Is patient a BCPI-A Bundle: Bundle letter will be distributed by unit CM if pt meets bundle criteria. If yes, was Bundle Letter given?:     Caregiver Contact:  Fadi Knight  [phone]  Discharge Caregiver contacted prior to discharge? Unit CM to follow up before discharge       Reason for Admission:  Acute Hypoxic Respiratory Failure                  RUR Score:     9%           Plan for utilizing home health:  None         PCP: First and Last name:  Arash Humphreys MD     Name of Practice:    Are you a current patient: Yes/No: Yes   Approximate date of last visit: One month ago    Can you participate in a virtual visit with your PCP: Yes                    Current Advanced Directive/Advance Care Plan: Full Code  Advance Care Planning     General Advance Care Planning (ACP) Conversation    Date of Conversation: 10/24/2021  Conducted with: Patient with Decision Making Capacity    Healthcare Decision Maker:   No healthcare decision makers have been documented. Click here to complete [address] including selection of the Healthcare Decision Maker Relationship (ie \"Primary\")    Today we documented Decision Maker(s) consistent with Legal Next of Kin hierarchy. Content/Action Overview: Has ACP document(s) NOT on file - requested patient to provide  Reviewed DNR/DNI and patient elects Full Code (Attempt Resuscitation)       Length of Voluntary ACP Conversation in minutes:  <16 minutes (Non-Billable)    Serjio Huitron RN                       Transition of Care Plan:                    CM met with patient at the bedside to discuss discharge planning.   Patient name, , and demographics all verified in chart. Patient lives in a two level home. Patient does not use any DME at baseline besides a nebulizer machine. Patient is independent of his ADLS/IDLS including driving. Patient has no SNF, HH, or IPR history. Patient preferred pharmacy is [facility]. Patient has an AMD at home. Requested patient to provide if able. Patient is active with his PCP. Care Management Interventions  PCP Verified by CM: Yes  Mode of Transport at Discharge: Other (see comment) (Daughter to transport )  Transition of Care Consult (CM Consult): Discharge Planning  Discharge Durable Medical Equipment: No  Physical Therapy Consult: No  Occupational Therapy Consult: No  Speech Therapy Consult: No  Support Systems: Spouse/Significant Other, Child(suzan), Other Family Member(s)  Confirm Follow Up Transport: Self  Discharge Location  Discharge Placement: Home with family assistance    Unit CM to continue to follow for discharge needs and planning.     KRISHNA DamonN, RN, BSW   RN Care Manager

## 2021-10-25 NOTE — ACP (ADVANCE CARE PLANNING)
Advance Care Planning Note      NAME: Reagan Valladares   :  [id number]   MRN:  [id number]     Date/Time:  10/25/2021 5:57 AM    Active Diagnoses:  Hospital Problems  Never Reviewed        Codes Class Noted POA    COPD exacerbation ([facility] [address] ICD-10-CM: J44.1  ICD-9-CM: 491.21  10/25/2021 Unknown        Acute respiratory failure with hypoxia [facility]) ICD-10-CM: J96.01  ICD-9-CM: 518.81  10/25/2021 Unknown              These active diagnoses are of sufficient risk that focused discussion on advance care planning is indicated in order to allow the patient to thoughtfully consider personal goals of care, and if situations arise that prevent the ability to personally give input, to ensure appropriate representation of their personal desires for different levels and aggressiveness of care. Discussion:   Code status addressed and wants to be a Full Code. Patient wants central line and vasopressors if needed. Patient would also NOT want a feeding tube/artificial nutrition. Patient  would like to assign daughter Ho Perdomo  as the surrogate decision maker. Persons present and participating in discussion: Walker Zamora MD,       Time Spent:   Total time spent face-to-face in education and discussion:   < 16  minutes.      Non billable Kristina Amaral MD   Hospitalist

## 2021-10-25 NOTE — ED NOTES
Spoke with infection control, patient should be on droplet precautions d/t rhinovirus. Patient is allowed visitors.

## 2021-10-25 NOTE — ED NOTES
Patient noted to be 87-89% on room air. 3L NC placed on patient with improvement of oxygen saturation. MD Castaneda made aware.

## 2021-10-25 NOTE — ED NOTES
Patient is being transferred to [address], Room # 2132. Report given to Kaden Sanders on [facility] for routine progression of care. Report consisted of the following information SBAR. Patient transferred to receiving unit by: transport (RN or tech name). Outstanding consults needed: No     Next labs due: Yes    The following personal items will be sent with the patient during transfer to the floor:     All valuables:    Cardiac monitoring ordered: Yes    The following CURRENT information was reported to the receiving RN:    Code status: Full Code at time of transfer    Last set of vital signs:  Vital Signs  Level of Consciousness: Alert (0) (10/25/21 0732)  Temp: 97.7 °F (36.5 °C) (10/25/21 0732)  Temp Source: Oral (10/25/21 0732)  Pulse (Heart Rate): (!) 57 (10/25/21 1200)  Heart Rate Source: Monitor (10/25/21 0732)  Cardiac Rhythm: Sinus Jose (10/25/21 0434)  Resp Rate: 22 (10/25/21 1200)  BP: (!) 147/76 (10/25/21 1200)  MAP (Monitor): 93 (10/25/21 1200)  MAP (Calculated): 100 (10/25/21 1200)  BP 1 Location: Left arm (10/25/21 0732)  BP 1 Method: Automatic (10/25/21 0732)  BP Patient Position: At rest (10/25/21 0732)  MEWS Score: 2 (10/25/21 0732)         Oxygen Therapy  O2 Sat (%): 99 % (10/25/21 1200)  Pulse via Oximetry: 55 beats per minute (10/25/21 1200)  O2 Device: Nasal cannula (10/25/21 0959)  O2 Flow Rate (L/min): 2 l/min (10/25/21 0959)      Last pain assessment:  Pain 1  Pain Scale 1: Numeric (0 - 10)  Pain Intensity 1: 0  Patient Stated Pain Goal: 0  Pain Reassessment 1: Yes      Wounds: No     Urinary catheter: voiding  Is there a rdz order: No     LDAs:       Peripheral IV 10/24/21 Right Antecubital (Active)   Site Assessment Clean, dry, & intact 10/25/21 0835   Phlebitis Assessment 0 10/25/21 0835   Infiltration Assessment 0 10/25/21 0835   Dressing Status Clean, dry, & intact 10/25/21 0835   Dressing Type Transparent 10/25/21 0835   Hub Color/Line Status Pink 10/25/21 0835 Opportunity for questions and clarification was provided.     Jamia Delgado RN
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
## 2021-10-26 LAB
L PNEUMO1 AG UR QL IA: NEGATIVE
PROCALCITONIN SERPL-MCNC: <0.05 NG/ML
SPECIMEN SOURCE: NORMAL

## 2021-10-26 PROCEDURE — 74011000250 HC RX REV CODE- 250: Performed by: INTERNAL MEDICINE

## 2021-10-26 PROCEDURE — 36415 COLL VENOUS BLD VENIPUNCTURE: CPT

## 2021-10-26 PROCEDURE — 74011250636 HC RX REV CODE- 250/636: Performed by: GENERAL ACUTE CARE HOSPITAL

## 2021-10-26 PROCEDURE — 74011250637 HC RX REV CODE- 250/637: Performed by: INTERNAL MEDICINE

## 2021-10-26 PROCEDURE — 74011000250 HC RX REV CODE- 250: Performed by: GENERAL ACUTE CARE HOSPITAL

## 2021-10-26 PROCEDURE — 74011250637 HC RX REV CODE- 250/637: Performed by: NURSE PRACTITIONER

## 2021-10-26 PROCEDURE — 84145 PROCALCITONIN (PCT): CPT

## 2021-10-26 PROCEDURE — 65660000000 HC RM CCU STEPDOWN

## 2021-10-26 PROCEDURE — 94640 AIRWAY INHALATION TREATMENT: CPT

## 2021-10-26 PROCEDURE — 74011250637 HC RX REV CODE- 250/637: Performed by: GENERAL ACUTE CARE HOSPITAL

## 2021-10-26 PROCEDURE — 94762 N-INVAS EAR/PLS OXIMTRY CONT: CPT

## 2021-10-26 PROCEDURE — 74011000250 HC RX REV CODE- 250: Performed by: NURSE PRACTITIONER

## 2021-10-26 PROCEDURE — 77010033678 HC OXYGEN DAILY

## 2021-10-26 PROCEDURE — 94760 N-INVAS EAR/PLS OXIMETRY 1: CPT

## 2021-10-26 RX ORDER — BENZONATATE 100 MG/1
100 CAPSULE ORAL
Status: DISCONTINUED | OUTPATIENT
Start: 2021-10-26 | End: 2021-10-28 | Stop reason: HOSPADM

## 2021-10-26 RX ORDER — IPRATROPIUM BROMIDE AND ALBUTEROL SULFATE 2.5; .5 MG/3ML; MG/3ML
3 SOLUTION RESPIRATORY (INHALATION)
Status: DISCONTINUED | OUTPATIENT
Start: 2021-10-26 | End: 2021-10-28 | Stop reason: HOSPADM

## 2021-10-26 RX ADMIN — IPRATROPIUM BROMIDE AND ALBUTEROL SULFATE 3 ML: .5; 3 SOLUTION RESPIRATORY (INHALATION) at 14:00

## 2021-10-26 RX ADMIN — METOPROLOL TARTRATE 25 MG: 25 TABLET, FILM COATED ORAL at 10:19

## 2021-10-26 RX ADMIN — TERAZOSIN HYDROCHLORIDE 5 MG: 5 CAPSULE ORAL at 22:08

## 2021-10-26 RX ADMIN — ARFORMOTEROL TARTRATE: 15 SOLUTION RESPIRATORY (INHALATION) at 20:13

## 2021-10-26 RX ADMIN — METHYLPREDNISOLONE SODIUM SUCCINATE 40 MG: 40 INJECTION, POWDER, FOR SOLUTION INTRAMUSCULAR; INTRAVENOUS at 21:41

## 2021-10-26 RX ADMIN — ASPIRIN 81 MG: 81 TABLET, CHEWABLE ORAL at 10:18

## 2021-10-26 RX ADMIN — ENOXAPARIN SODIUM 40 MG: 40 INJECTION SUBCUTANEOUS at 10:19

## 2021-10-26 RX ADMIN — METHYLPREDNISOLONE SODIUM SUCCINATE 40 MG: 40 INJECTION, POWDER, FOR SOLUTION INTRAMUSCULAR; INTRAVENOUS at 06:26

## 2021-10-26 RX ADMIN — HYDRALAZINE HYDROCHLORIDE 50 MG: 50 TABLET, FILM COATED ORAL at 17:57

## 2021-10-26 RX ADMIN — GUAIFENESIN 600 MG: 600 TABLET ORAL at 21:41

## 2021-10-26 RX ADMIN — Medication 10 ML: at 21:43

## 2021-10-26 RX ADMIN — HYDRALAZINE HYDROCHLORIDE 50 MG: 50 TABLET, FILM COATED ORAL at 10:19

## 2021-10-26 RX ADMIN — Medication 10 ML: at 06:26

## 2021-10-26 RX ADMIN — METHYLPREDNISOLONE SODIUM SUCCINATE 40 MG: 40 INJECTION, POWDER, FOR SOLUTION INTRAMUSCULAR; INTRAVENOUS at 13:16

## 2021-10-26 RX ADMIN — IPRATROPIUM BROMIDE AND ALBUTEROL SULFATE 3 ML: .5; 3 SOLUTION RESPIRATORY (INHALATION) at 02:16

## 2021-10-26 RX ADMIN — GUAIFENESIN 600 MG: 600 TABLET ORAL at 10:18

## 2021-10-26 RX ADMIN — PRAVASTATIN SODIUM 40 MG: 40 TABLET ORAL at 21:41

## 2021-10-26 RX ADMIN — IPRATROPIUM BROMIDE AND ALBUTEROL SULFATE 3 ML: .5; 3 SOLUTION RESPIRATORY (INHALATION) at 20:08

## 2021-10-26 RX ADMIN — BENZONATATE 100 MG: 100 CAPSULE ORAL at 16:58

## 2021-10-26 RX ADMIN — IPRATROPIUM BROMIDE AND ALBUTEROL SULFATE 3 ML: .5; 3 SOLUTION RESPIRATORY (INHALATION) at 06:30

## 2021-10-26 RX ADMIN — CETIRIZINE HYDROCHLORIDE 10 MG: 10 TABLET, FILM COATED ORAL at 10:19

## 2021-10-26 RX ADMIN — Medication 10 ML: at 13:17

## 2021-10-26 RX ADMIN — IPRATROPIUM BROMIDE AND ALBUTEROL SULFATE 3 ML: .5; 3 SOLUTION RESPIRATORY (INHALATION) at 16:58

## 2021-10-26 NOTE — PROGRESS NOTES
Hospitalist Progress Note    NAME: Nawaf Garsia   :  1945   MRN:  [id number]       Assessment / Plan:  Acute hypoxic respiratory failure due to RSV PNA, POA  Acute COPD exacerbation  Pt's grand children has the \"sniffles\" /rhirnorrhea this past week per pt's daughter  [place] has been ruled out. Pt is fully vaccinated against covi19  procalcitonin low so will discontinue abx  IV solumedrol, wean as tolerated  F/u with legionella ag sent. Supportive care for now with nebs, antitussives, weaning O2  PT/OT, IS use  Wean O2 as tolerated. Pt was not on home O2     CAD  Hypertension  2 stents AICD/PPM  Con't home meds     Thrombocytopenia  No prior records for comparison  Could related to current infection, improving     NICKIE: 10/27-28 pending clinical improvement    Code Status: full   DVT Prophylaxis: lovenvox  GI Prophylaxis: not indicated  Baseline: independent      Subjective:     Chief Complaint / Reason for Physician Visit  Pt up in bed, on 2LNC. No sob. Coughing but not bringing up sputum. Daughter at bedside was updated. Discussed with RN events overnight. Review of Systems:  Symptom Y/N Comments  Symptom Y/N Comments   Fever/Chills    Chest Pain     Poor Appetite    Edema     Cough y   Abdominal Pain     Sputum    Joint Pain     SOB/FLORES    Pruritis/Rash     Nausea/vomit    Tolerating PT/OT     Diarrhea    Tolerating Diet     Constipation    Other       Could NOT obtain due to:      Objective:     VITALS:   Last 24hrs VS reviewed since prior progress note.  Most recent are:  Patient Vitals for the past 24 hrs:   Temp Pulse Resp BP SpO2   10/26/21 0641 98 °F (36.7 °C) 64 18 138/80 94 %   10/26/21 0630     95 %   10/26/21 0216     96 %   10/25/21 2311 98.1 °F (36.7 °C) 61 18 132/81 98 %   10/25/21 1837     98 %   10/25/21 1702 98 °F (36.7 °C) (!) 56 22 (!) 146/73 97 %   10/25/21 1200  (!) 57 22 (!) 147/76 99 %     No intake or output data in the 24 hours ending 10/26/21 1152 I had a face to face encounter and independently examined this patient on 10/26/2021, as outlined below:  PHYSICAL EXAM:  General: WD, WN. Alert, cooperative, no acute distress    EENT:  EOMI. Anicteric sclerae. MMM  Resp:  CTA bilaterally, no wheezing or rales. No accessory muscle use  CV:  Regular  rhythm,  No edema  GI:  Soft, Non distended, Non tender. +Bowel sounds  Neurologic:  Alert and oriented X 3, normal speech,   Psych:   Good insight. Not anxious nor agitated  Skin:  No rashes. No jaundice    Reviewed most current lab test results and cultures  YES  Reviewed most current radiology test results   YES  Review and summation of old records today    NO  Reviewed patient's current orders and MAR    YES  PMH/SH reviewed - no change compared to H&P  ________________________________________________________________________  Care Plan discussed with:    Comments   Patient x    Family  x daughter   RN x    Care Manager     Consultant                        Multidiciplinary team rounds were held today with , nursing, pharmacist and clinical coordinator. Patient's plan of care was discussed; medications were reviewed and discharge planning was addressed. ________________________________________________________________________  Total NON critical care TIME:  35  Minutes    Total CRITICAL CARE TIME Spent:   Minutes non procedure based      Comments   >50% of visit spent in counseling and coordination of care     ________________________________________________________________________  Jairo Aldana MD     Procedures: see electronic medical records for all procedures/Xrays and details which were not copied into this note but were reviewed prior to creation of Plan. LABS:  I reviewed today's most current labs and imaging studies.   Pertinent labs include:  Recent Labs     10/25/21  0740 10/24/21  2319   WBC 3.3* 4.0*   HGB 12.1 12.2   HCT 38.9 38.7   PLT 93* 89*     Recent Labs     10/25/21  0740 10/24/21  2319    135*   K 4.3 3.9    108   CO2 29 25   * 95   BUN 12 14   CREA 1.06 1.13   CA 8.7 8.5   ALB 3.0* 3.0*   TBILI 0.4 0.4   ALT 20 20       Signed: Mele Jacobson MD

## 2021-10-26 NOTE — PROGRESS NOTES
Patient has been wheezing all night and having some crackers, Respiratory paged, PRN neb. Treatment given ant 0200, patient still wheezing. Message Dr Lacy Cavanaugh, she changed Albuterol order from Q 6 to Q 4. Patient got a dose about 0620. Patient c/o cough, order obtained for Mucinex, given with good relief.

## 2021-10-26 NOTE — ADT AUTH CERT NOTES
Comments  Comment     Last edited by  on  at    Patient Demographics    Patient Name   Jerel Winters   [id number] Legal Sex   Male    1945 Address   [address]   [address] [address] Phone   [phone] (Home) *Preferred*   Patient Demographics    Patient Name   Jerel Winters   [id number] Legal Sex   Male    1945 Address   [address]   [address] [facility] [address] Phone   [phone] (Home) *Preferred*   CSN:   [id number]   Admit Date: Admit Time Room Bed   Oct 24, 2021 11:24 PM 2132 [[id number]]  [[id number]]   Attending Providers    Provider Pager From To   Cecile Gaytan MD  10/24/21 10/25/21   Ratna Abbott MD  10/25/21 10/25/21   Daniel Fraser MD  10/25/21 10/25/21   Daniel Fraser MD  10/25/21 10/26/21   Azeb Garcia MD  10/26/21 10/26/21   Daniel Fraser MD  10/26/21    Emergency Contact(s)    Name Relation Home Work Lurdes Hernandez Spouse [phone]     Russell Cummings Daughter   [phone]   Utilization Reviews       Chronic Obstructive Pulmonary Disease - Care Day 2 (10/26/2021) by Silvia Ventura RN       Review Entered Review Status   10/26/2021 12:36 Completed      Criteria Review      Care Day: 2 Care Date: 10/26/2021 Level of Care: Telemetry    Guideline Day 2    Level Of Care    (X) Floor or intermediate care    10/26/2021 12:36:42 EDT by Maximino Yu    Clinical Status    (X) * Hemodynamic stability    10/26/2021 12:36:42 EDT by Silvia Ventura      Stable  VS:  98 °F (36.7 °C) 64 138/80  18 94 % Nasal cannula 2 l/min    (X) * Mechanical and noninvasive ventilation absent    10/26/2021 12:36:42 EDT by Amilcar Madrid Not on vent    (X) * Uncompensated acidosis absent    10/26/2021 12:36:42 EDT by Amilcar Madrid absent    Activity    (X) Ambulatory    10/26/2021 12:36:42 EDT by Amilcar Madrid UP AD ZULEMA    Routes    (X) Diet as tolerated    10/26/2021 12:36:42 EDT by Amilcar Madrid Adult Diet Reg    (X) IV medications    10/26/2021 12:36:42 EDT by Zeina Harris      IV meds allowed    Interventions    (X) Oxygen    10/26/2021 12:36:42 EDT by Zeina Harris      NC 2 l/min    (X) Pulse oximetry    10/26/2021 12:36:42 EDT by Zeina Harris      84    (X) DVT prophylaxis    10/26/2021 12:36:42 EDT by Zeina Harris      Lovenox 40mg SC qd    Medications    (X) Systemic corticosteroids    10/26/2021 12:36:42 EDT by Zeina Harris      Solu-Medrol 40mg IV q8h    (X) Inhaled bronchodilators    10/26/2021 12:36:42 EDT by Zeina Harris      DUO-NEB 3mL NEB q4h PRN (x1)    * Milestone   Additional Notes   Day 2: 10/26      RN NOTE:   Patient has been wheezing all night and having some crackers, Respiratory paged, PRN neb. Treatment given ant 0200, patient still wheezing. Message Dr Milli Wheeler, she changed Albuterol order from Q 6 to Q 4. Patient got a dose about 0620. Patient c/o cough, order obtained for Mucinex, given with good relief. IM-      Subjective:       Chief Complaint / Reason for Physician Visit   Pt up in bed, on 2LNC.  No sob.  Coughing but not bringing up sputum      PHYSICAL EXAM:   General:          WD, WN. Alert, cooperative, no acute distress     EENT:              EOMI. Anicteric sclerae.  MMM   Resp:               CTA bilaterally, no wheezing or rales.  No accessory muscle use   CV:                  Regular  rhythm,  No edema   GI:                   Soft, Non distended, Non tender.  +Bowel sounds   Neurologic:       Alert and oriented X 3, normal speech,    Psych:   Good insight. Not anxious nor agitated   Skin:                No rashes.  No jaundice      No add'l labs      MEDS: Asa 81mg PO qd Zyrtec 10mg PO qd Mucinex 600mg PO q12h Apresoline 50mg PO BID Lopressor 25mg P BID      IM-   Assessment / Plan:   Acute hypoxic respiratory failure due to RSV PNA, POA   Acute COPD exacerbation   Pt's grand children has the \"sniffles\" /rhirnorrhea this past week per pt's daughter   [place] has been ruled out.  Pt is fully vaccinated against [id number]   procalcitonin low so will discontinue abx   IV solumedrol, wean as tolerated   F/u with legionella ag sent. Supportive care for now with nebs, antitussives, weaning O2   PT/OT, IS use   Wean O2 as tolerated.  Pt was not on home O2       CAD   Hypertension   2 stents AICD/PPM   Con't home meds       Thrombocytopenia   No prior records for comparison   Could related to current infection, improving       NICKIE: 10/27-28 pending clinical improvement       Code Status: full    DVT Prophylaxis: lovenvox   GI Prophylaxis: not indicated   Baseline: independent             PA Recommendation by Mono Klein RN       Review Entered Review Status   10/26/2021 12:21 In Primary      Criteria Review   We recommend that the following pt's hospitalization under INPATIENT [101] status is APPROPRIATE       Clinical summary   Acute hypoxic hypercapnic respiratory failure  COPD exacerbation       Vitals   Labs and Imaging   MCG criteria applies YES  Comments       The Documentation in the medical record does supports Inpatient Level of care currently. This decision is based on Admitting diagnosis, clinical and diagnostic findings, Severity of signs and symptoms, Hospital treatment and progress. Furthermore, this patient is at potential risk to develop adverse events and complications from the Initial clinical diagnosis or from chronic medical conditions.     This chart was reviewed at 10:34 AM 10/26/2021    Mariana Barrera MD MD   Physician Janie Drake [address] [place] [address] [phone]

## 2021-10-26 NOTE — PROGRESS NOTES
Received notification from bedside RN about patient with regards to: requesting medication for cough  VS: /73, HR 56, RR 22, O2 sat 97% on NC 2 L    Intervention given: Mucinex PO BID, Robitussin PO prn ordered

## 2021-10-27 LAB
ANION GAP SERPL CALC-SCNC: 5 MMOL/L (ref 5–15)
BASOPHILS # BLD: 0 K/UL (ref 0–0.1)
BASOPHILS NFR BLD: 0 % (ref 0–1)
BUN SERPL-MCNC: 21 MG/DL (ref 6–20)
BUN/CREAT SERPL: 20 (ref 12–20)
CALCIUM SERPL-MCNC: 9 MG/DL (ref 8.5–10.1)
CHLORIDE SERPL-SCNC: 106 MMOL/L (ref 97–108)
CO2 SERPL-SCNC: 24 MMOL/L (ref 21–32)
CREAT SERPL-MCNC: 1.05 MG/DL (ref 0.7–1.3)
DIFFERENTIAL METHOD BLD: ABNORMAL
EOSINOPHIL # BLD: 0 K/UL (ref 0–0.4)
EOSINOPHIL NFR BLD: 0 % (ref 0–7)
ERYTHROCYTE [DISTWIDTH] IN BLOOD BY AUTOMATED COUNT: 14.6 % (ref 11.5–14.5)
GLUCOSE SERPL-MCNC: 135 MG/DL (ref 65–100)
HCT VFR BLD AUTO: 37.2 % (ref 36.6–50.3)
HGB BLD-MCNC: 11.9 G/DL (ref 12.1–17)
IMM GRANULOCYTES # BLD AUTO: 0.1 K/UL (ref 0–0.04)
IMM GRANULOCYTES NFR BLD AUTO: 1 % (ref 0–0.5)
LYMPHOCYTES # BLD: 0.6 K/UL (ref 0.8–3.5)
LYMPHOCYTES NFR BLD: 8 % (ref 12–49)
MCH RBC QN AUTO: 30.7 PG (ref 26–34)
MCHC RBC AUTO-ENTMCNC: 32 G/DL (ref 30–36.5)
MCV RBC AUTO: 95.9 FL (ref 80–99)
MONOCYTES # BLD: 0.2 K/UL (ref 0–1)
MONOCYTES NFR BLD: 2 % (ref 5–13)
NEUTS SEG # BLD: 6.6 K/UL (ref 1.8–8)
NEUTS SEG NFR BLD: 89 % (ref 32–75)
NRBC # BLD: 0 K/UL (ref 0–0.01)
NRBC BLD-RTO: 0 PER 100 WBC
PLATELET # BLD AUTO: 97 K/UL (ref 150–400)
PMV BLD AUTO: 10.3 FL (ref 8.9–12.9)
POTASSIUM SERPL-SCNC: 4.3 MMOL/L (ref 3.5–5.1)
RBC # BLD AUTO: 3.88 M/UL (ref 4.1–5.7)
SODIUM SERPL-SCNC: 135 MMOL/L (ref 136–145)
WBC # BLD AUTO: 7.4 K/UL (ref 4.1–11.1)

## 2021-10-27 PROCEDURE — 74011250637 HC RX REV CODE- 250/637: Performed by: NURSE PRACTITIONER

## 2021-10-27 PROCEDURE — 97530 THERAPEUTIC ACTIVITIES: CPT | Performed by: OCCUPATIONAL THERAPIST

## 2021-10-27 PROCEDURE — 74011250637 HC RX REV CODE- 250/637: Performed by: INTERNAL MEDICINE

## 2021-10-27 PROCEDURE — 85025 COMPLETE CBC W/AUTO DIFF WBC: CPT

## 2021-10-27 PROCEDURE — 97116 GAIT TRAINING THERAPY: CPT

## 2021-10-27 PROCEDURE — 74011000250 HC RX REV CODE- 250: Performed by: INTERNAL MEDICINE

## 2021-10-27 PROCEDURE — 65660000000 HC RM CCU STEPDOWN

## 2021-10-27 PROCEDURE — 97162 PT EVAL MOD COMPLEX 30 MIN: CPT

## 2021-10-27 PROCEDURE — 74011250636 HC RX REV CODE- 250/636: Performed by: GENERAL ACUTE CARE HOSPITAL

## 2021-10-27 PROCEDURE — 94762 N-INVAS EAR/PLS OXIMTRY CONT: CPT

## 2021-10-27 PROCEDURE — 80048 BASIC METABOLIC PNL TOTAL CA: CPT

## 2021-10-27 PROCEDURE — 97530 THERAPEUTIC ACTIVITIES: CPT

## 2021-10-27 PROCEDURE — 77010033678 HC OXYGEN DAILY

## 2021-10-27 PROCEDURE — 74011000250 HC RX REV CODE- 250: Performed by: GENERAL ACUTE CARE HOSPITAL

## 2021-10-27 PROCEDURE — 74011000250 HC RX REV CODE- 250: Performed by: NURSE PRACTITIONER

## 2021-10-27 PROCEDURE — 97165 OT EVAL LOW COMPLEX 30 MIN: CPT | Performed by: OCCUPATIONAL THERAPIST

## 2021-10-27 PROCEDURE — 74011250637 HC RX REV CODE- 250/637: Performed by: GENERAL ACUTE CARE HOSPITAL

## 2021-10-27 PROCEDURE — 36415 COLL VENOUS BLD VENIPUNCTURE: CPT

## 2021-10-27 PROCEDURE — 94640 AIRWAY INHALATION TREATMENT: CPT

## 2021-10-27 PROCEDURE — 97535 SELF CARE MNGMENT TRAINING: CPT | Performed by: OCCUPATIONAL THERAPIST

## 2021-10-27 RX ADMIN — Medication 10 ML: at 05:39

## 2021-10-27 RX ADMIN — TERAZOSIN HYDROCHLORIDE 5 MG: 5 CAPSULE ORAL at 22:00

## 2021-10-27 RX ADMIN — HYDRALAZINE HYDROCHLORIDE 50 MG: 50 TABLET, FILM COATED ORAL at 17:26

## 2021-10-27 RX ADMIN — BENZONATATE 100 MG: 100 CAPSULE ORAL at 18:56

## 2021-10-27 RX ADMIN — METHYLPREDNISOLONE SODIUM SUCCINATE 40 MG: 40 INJECTION, POWDER, FOR SOLUTION INTRAMUSCULAR; INTRAVENOUS at 22:00

## 2021-10-27 RX ADMIN — METOPROLOL TARTRATE 25 MG: 25 TABLET, FILM COATED ORAL at 09:26

## 2021-10-27 RX ADMIN — BENZONATATE 100 MG: 100 CAPSULE ORAL at 12:52

## 2021-10-27 RX ADMIN — IPRATROPIUM BROMIDE AND ALBUTEROL SULFATE 3 ML: .5; 3 SOLUTION RESPIRATORY (INHALATION) at 08:50

## 2021-10-27 RX ADMIN — IPRATROPIUM BROMIDE AND ALBUTEROL SULFATE 3 ML: .5; 3 SOLUTION RESPIRATORY (INHALATION) at 02:13

## 2021-10-27 RX ADMIN — CETIRIZINE HYDROCHLORIDE 10 MG: 10 TABLET, FILM COATED ORAL at 09:26

## 2021-10-27 RX ADMIN — HYDRALAZINE HYDROCHLORIDE 50 MG: 50 TABLET, FILM COATED ORAL at 09:26

## 2021-10-27 RX ADMIN — IPRATROPIUM BROMIDE AND ALBUTEROL SULFATE 3 ML: .5; 3 SOLUTION RESPIRATORY (INHALATION) at 20:59

## 2021-10-27 RX ADMIN — Medication 10 ML: at 13:12

## 2021-10-27 RX ADMIN — GUAIFENESIN 600 MG: 600 TABLET ORAL at 22:01

## 2021-10-27 RX ADMIN — GUAIFENESIN 600 MG: 600 TABLET ORAL at 09:26

## 2021-10-27 RX ADMIN — PRAVASTATIN SODIUM 40 MG: 40 TABLET ORAL at 22:00

## 2021-10-27 RX ADMIN — Medication 10 ML: at 22:00

## 2021-10-27 RX ADMIN — ARFORMOTEROL TARTRATE: 15 SOLUTION RESPIRATORY (INHALATION) at 08:50

## 2021-10-27 RX ADMIN — METHYLPREDNISOLONE SODIUM SUCCINATE 40 MG: 40 INJECTION, POWDER, FOR SOLUTION INTRAMUSCULAR; INTRAVENOUS at 05:39

## 2021-10-27 RX ADMIN — IPRATROPIUM BROMIDE AND ALBUTEROL SULFATE 3 ML: .5; 3 SOLUTION RESPIRATORY (INHALATION) at 12:52

## 2021-10-27 RX ADMIN — ARFORMOTEROL TARTRATE: 15 SOLUTION RESPIRATORY (INHALATION) at 21:00

## 2021-10-27 RX ADMIN — ENOXAPARIN SODIUM 40 MG: 40 INJECTION SUBCUTANEOUS at 09:26

## 2021-10-27 RX ADMIN — METHYLPREDNISOLONE SODIUM SUCCINATE 40 MG: 40 INJECTION, POWDER, FOR SOLUTION INTRAMUSCULAR; INTRAVENOUS at 13:11

## 2021-10-27 RX ADMIN — GUAIFENESIN 200 MG: 200 SOLUTION ORAL at 17:37

## 2021-10-27 RX ADMIN — ASPIRIN 81 MG: 81 TABLET, CHEWABLE ORAL at 09:26

## 2021-10-27 RX ADMIN — IPRATROPIUM BROMIDE AND ALBUTEROL SULFATE 3 ML: .5; 3 SOLUTION RESPIRATORY (INHALATION) at 14:00

## 2021-10-27 RX ADMIN — METOPROLOL TARTRATE 25 MG: 25 TABLET, FILM COATED ORAL at 22:01

## 2021-10-27 NOTE — PROGRESS NOTES
Bedside and Verbal shift change report given to [facility] (oncoming nurse) by Adriano Prieto (offgoing nurse). Report included the following information SBAR, Kardex, Intake/Output, MAR and Recent Results.

## 2021-10-27 NOTE — PROGRESS NOTES
Orders received, chart reviewed and patient evaluated by physical therapy. Pending progression with skilled acute physical therapy, recommend:  No skilled physical therapy/ follow up rehabilitation needs identified at this time. Safe to discharge home from PT standpoint. Will sign off. Recommend OOB to chair 3x/day and walking ad fabiola to tolerance and no devices. Thank you for completing as able in order to maintain patient strength, endurance and independence. Full evaluation to follow.      Daniel Burciaga, PT

## 2021-10-27 NOTE — PROGRESS NOTES
Hospitalist Progress Note    NAME: Summer Estrella   :  1945   MRN:  [id number]       Assessment / Plan:  Acute hypoxic respiratory failure due to RSV PNA, POA  Acute COPD exacerbation  Pt's grand children has the \"sniffles\" /rhirnorrhea this past week per pt's daughter  Rajeev Tinoco has been ruled out. Pt is fully vaccinated against covi19  procalcitonin low so will discontinue abx  IV solumedrol, wean as tolerated  F/u with legionella ag sent. Supportive care for now with nebs, antitussives, weaning O2  PT/OT, IS use  Wean O2 as tolerated. Pt was not on home O2  10/27: Patient is on room air and doing better     CAD  Hypertension  2 stents AICD/PPM  Con't home meds     Thrombocytopenia  No prior records for comparison  Could related to current infection, improving     NICKIE: Discharge on 10/28  Code Status: full   DVT Prophylaxis: lovenvox  GI Prophylaxis: not indicated  Baseline: independent      Subjective:     Chief Complaint / Reason for Physician Visit  Follow-up RSV pneumonia. Patient doing better, on room air discussed with RN events overnight. Review of Systems:  Symptom Y/N Comments  Symptom Y/N Comments   Fever/Chills    Chest Pain     Poor Appetite    Edema     Cough y   Abdominal Pain     Sputum    Joint Pain     SOB/FLORES    Pruritis/Rash     Nausea/vomit    Tolerating PT/OT     Diarrhea    Tolerating Diet     Constipation    Other       Could NOT obtain due to:      Objective:     VITALS:   Last 24hrs VS reviewed since prior progress note.  Most recent are:  Patient Vitals for the past 24 hrs:   Temp Pulse Resp BP SpO2   10/27/21 0832 97.8 °F (36.6 °C) (!) 59 18 (!) 149/75 99 %   10/27/21 0359 98.1 °F (36.7 °C) 60 18 (!) 147/66 98 %   10/27/21 0213     98 %   10/27/21 0017 97.5 °F (36.4 °C) (!) 55 17 (!) 140/70 99 %   10/26/21 2208  (!) 59  (!) 153/76    10/26/21 2009     96 %   10/26/21 1954 98.1 °F (36.7 °C) 60 16 136/75 97 %   10/26/21 1606 98 °F (36.7 °C) 68 16 132/74 98 % 10/26/21 1501     97 %   10/26/21 1115 98.1 °F (36.7 °C) 60 16 138/78 99 %     No intake or output data in the 24 hours ending 10/27/21 0913     I had a face to face encounter and independently examined this patient on 10/27/2021, as outlined below:  PHYSICAL EXAM:  General: WD, WN. Alert, cooperative, no acute distress    EENT:  EOMI. Anicteric sclerae. MMM  Resp:  CTA bilaterally, no wheezing or rales. No accessory muscle use  CV:  Regular  rhythm,  No edema  GI:  Soft, Non distended, Non tender. +Bowel sounds  Neurologic:  Alert and oriented X 3, normal speech,   Psych:   Good insight. Not anxious nor agitated  Skin:  No rashes. No jaundice    Reviewed most current lab test results and cultures  YES  Reviewed most current radiology test results   YES  Review and summation of old records today    NO  Reviewed patient's current orders and MAR    YES  PMH/SH reviewed - no change compared to H&P  ________________________________________________________________________  Care Plan discussed with:    Comments   Patient x    Family  x    RN x    Care Manager     Consultant                        Multidiciplinary team rounds were held today with , nursing, pharmacist and clinical coordinator. Patient's plan of care was discussed; medications were reviewed and discharge planning was addressed. ________________________________________________________________________  Total NON critical care TIME:  35  Minutes    Total CRITICAL CARE TIME Spent:   Minutes non procedure based      Comments   >50% of visit spent in counseling and coordination of care     ________________________________________________________________________  Daniel Rodriguez MD     Procedures: see electronic medical records for all procedures/Xrays and details which were not copied into this note but were reviewed prior to creation of Plan. LABS:  I reviewed today's most current labs and imaging studies.   Pertinent labs include:  Recent Labs     10/27/21  0225 10/25/21  0740 10/24/21  2319   WBC 7.4 3.3* 4.0*   HGB 11.9* 12.1 12.2   HCT 37.2 38.9 38.7   PLT 97* 93* 89*     Recent Labs     10/27/21  0225 10/25/21  0740 10/24/21  2319   * 138 135*   K 4.3 4.3 3.9    108 108   CO2 24 29 25   * 128* 95   BUN 21* 12 14   CREA 1.05 1.06 1.13   CA 9.0 8.7 8.5   ALB  --  3.0* 3.0*   TBILI  --  0.4 0.4   ALT  --  20 20       Signed: Chari Bey MD

## 2021-10-27 NOTE — ADT AUTH CERT NOTES
PER MESSAGE FROM NURSE:    No Home O2. No ABG's to provide. COVID negative. Will do D3 concurrent review once MD Notes become available. Comments  Comment     Last edited by  on  at    Patient Demographics    Patient Name   Ashley Can   [id number] Legal Sex   Male    1945 [facility]    [place] [facility] [address] Phone   [phone] (Home) *Preferred*   CSN:   [id number]   Admit Date: Admit Time Room Bed   Oct 24, 2021 11:24 PM  [[id number]]  [[id number]]   Attending Providers    Provider Pager From To   Marie Denis MD  10/24/21 10/25/21   Dee Spence MD  10/25/21 10/25/21   Matty Wiley MD  10/25/21 10/25/21   Matty Wiley MD  10/25/21 10/26/21   Jose Ashton MD  10/26/21 10/26/21   Matty Wiley MD  10/26/21    Emergency Contact(s)    Name Relation Home Work Mobile   Lurdes Gomez Spouse [phone]     Clare Hernandes Daughter   [phone]   Most Recent Utilization Review       Additional Clinical Requested by Jerry Chiang RN       Review Status   In Primary      Criteria Review   10/25 (again)-   No history of COPD but on albuterol inhalers on an as-needed basis at home.   Initial oxygen saturation was 87% but that has improved with Solu-Medrol and nebulizers at the emergency room but patient still significantly wheezing and hospitalist consulted.     XR CHEST PA LAT     Result Date: 10/25/2021  Patchy interstitial and airspace opacities are suspicious for infection possibly Covid 19 pneumonia        10/27/21 0832 97.8 °F (36.6 °C) 59Abnormal  149/75Abnormal  18 99 % Nasal cannula 3 l/min   10/27/21 0359 98.1 °F (36.7 °C) 60 147/66Abnormal  18 98 %     10/27/21 0213     98 % Nasal cannula 3 l/min   10/27/21 0017 97.5 °F (36.4 °C) 55Abnormal  140/70Abnormal  17 99 %     10/26/21 2208  59Abnormal  153/76Abnormal        10/26/21 2009     96 % Nasal cannula 3 l/min   10/26/21 1954 98.1 °F (36.7 °C) 60 136/75 16 97 % Nasal cannula 3 l/min   10/26/21 1606 98 °F (36.7 °C) 68 132/74 16 98 %     10/26/21 1501     97 % Nasal cannula 3 l/min   10/26/21 1115 98.1 °F (36.7 °C) 60 138/78 16 99 %     10/26/21 0845     98 % Nasal cannula 3 l/min   10/26/21 0641 98 °F (36.7 °C) 64 138/80 18 94 %     10/26/21 0630     95 % Nasal cannula 2 l/min   10/26/21 0216     96 % Nasal cannula 2 l/min   10/25/21 2311 98.1 °F (36.7 °C) 61 132/81 18 98 %     10/25/21 1837     98 % Nasal cannula    10/25/21 1830      Nasal cannula 2 l/min   10/25/21 1702 98 °F (36.7 °C) 56Abnormal  146/73Abnormal  22 97 %     10/25/21 1200  57Abnormal  147/76Abnormal  22 99 %     10/25/21 1100  58Abnormal  160/75Abnormal  25 100 %     10/25/21 0959     96 % Nasal cannula 2 l/min   10/25/21 0820  56Abnormal  165/78Abnormal  27 98 %     10/25/21 0815  56Abnormal  174/79Abnormal  30 99 %     10/25/21 0732 97.7 °F (36.5 °C) 56Abnormal  159/97Abnormal  25 99 % Nasal cannula 2 l/min
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
## 2021-10-27 NOTE — PROGRESS NOTES
Notified NP Mehnaz:Pt has a scheduled dose of terazosin 5mg due now and current HR is 56 and BP is 153/76. What do you recommend? Instructed to give dose  End of Shift Note    Bedside shift change report given to Valente Zamoar RN (oncoming nurse) by Marcos Purdy (offgoing nurse). Report included the following information SBAR, Kardex, ED Summary, Intake/Output, MAR and Recent Results    Shift worked:  [id number]     Shift summary and any significant changes:     Nothing overnight     Concerns for physician to address:       Zone phone for oncoming shift:          Activity:  Activity Level: Up ad fabiola  Number times ambulated in hallways past shift: 0  Number of times OOB to chair past shift: 1    Cardiac:   Cardiac Monitoring: Yes      Cardiac Rhythm: Sinus Rhythm    Access:   Current line(s): PIV     Genitourinary:   Urinary status: voiding    Respiratory:   O2 Device: Nasal cannula  Chronic home O2 use?: NO  Incentive spirometer at bedside: YES     GI:     Current diet:  ADULT DIET Regular; Low Fat/Low Chol/High Fiber/MARQUES  Passing flatus: YES  Tolerating current diet: YES       Pain Management:   Patient states pain is manageable on current regimen: YES    Skin:  Ravin Score: 20  Interventions: increase time out of bed and PT/OT consult    Patient Safety:  Fall Score:  Total Score: 1  Interventions: bed/chair alarm, gripper socks, pt to call before getting OOB and stay with me (per policy)       Length of Stay:  Expected LOS: 4d 2h  Actual LOS: [address]
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
## 2021-10-28 VITALS
HEART RATE: 71 BPM | BODY MASS INDEX: 21.91 KG/M2 | OXYGEN SATURATION: 95 % | HEIGHT: 69 IN | WEIGHT: 147.93 LBS | RESPIRATION RATE: 16 BRPM | DIASTOLIC BLOOD PRESSURE: 81 MMHG | TEMPERATURE: 98.2 F | SYSTOLIC BLOOD PRESSURE: 149 MMHG

## 2021-10-28 PROCEDURE — 74011000250 HC RX REV CODE- 250: Performed by: GENERAL ACUTE CARE HOSPITAL

## 2021-10-28 PROCEDURE — 74011250637 HC RX REV CODE- 250/637: Performed by: GENERAL ACUTE CARE HOSPITAL

## 2021-10-28 PROCEDURE — 74011000250 HC RX REV CODE- 250: Performed by: INTERNAL MEDICINE

## 2021-10-28 PROCEDURE — 74011250636 HC RX REV CODE- 250/636: Performed by: GENERAL ACUTE CARE HOSPITAL

## 2021-10-28 PROCEDURE — 74011250637 HC RX REV CODE- 250/637: Performed by: NURSE PRACTITIONER

## 2021-10-28 PROCEDURE — 74011250637 HC RX REV CODE- 250/637: Performed by: INTERNAL MEDICINE

## 2021-10-28 PROCEDURE — 94640 AIRWAY INHALATION TREATMENT: CPT

## 2021-10-28 RX ORDER — METOPROLOL TARTRATE 50 MG/1
25 TABLET ORAL 2 TIMES DAILY
Qty: 30 TABLET | Refills: 1 | Status: SHIPPED | OUTPATIENT
Start: 2021-10-28 | End: 2021-12-27

## 2021-10-28 RX ORDER — PREDNISONE 20 MG/1
40 TABLET ORAL
Qty: 10 TABLET | Refills: 0 | Status: SHIPPED | OUTPATIENT
Start: 2021-10-28 | End: 2021-11-02

## 2021-10-28 RX ORDER — IBUPROFEN 200 MG
1 TABLET ORAL EVERY 24 HOURS
Qty: 30 PATCH | Refills: 0 | Status: SHIPPED | OUTPATIENT
Start: 2021-10-28 | End: 2021-11-27

## 2021-10-28 RX ORDER — GUAIFENESIN 600 MG/1
600 TABLET, EXTENDED RELEASE ORAL EVERY 12 HOURS
Qty: 14 TABLET | Refills: 0 | Status: SHIPPED | OUTPATIENT
Start: 2021-10-28 | End: 2021-11-04

## 2021-10-28 RX ORDER — HYDRALAZINE HYDROCHLORIDE 25 MG/1
50 TABLET, FILM COATED ORAL 2 TIMES DAILY
Qty: 120 TABLET | Refills: 2 | Status: SHIPPED | OUTPATIENT
Start: 2021-10-28 | End: 2022-01-26

## 2021-10-28 RX ADMIN — METHYLPREDNISOLONE SODIUM SUCCINATE 40 MG: 40 INJECTION, POWDER, FOR SOLUTION INTRAMUSCULAR; INTRAVENOUS at 05:43

## 2021-10-28 RX ADMIN — GUAIFENESIN 600 MG: 600 TABLET ORAL at 08:36

## 2021-10-28 RX ADMIN — ENOXAPARIN SODIUM 40 MG: 40 INJECTION SUBCUTANEOUS at 08:35

## 2021-10-28 RX ADMIN — ASPIRIN 81 MG: 81 TABLET, CHEWABLE ORAL at 08:35

## 2021-10-28 RX ADMIN — HYDRALAZINE HYDROCHLORIDE 50 MG: 50 TABLET, FILM COATED ORAL at 08:35

## 2021-10-28 RX ADMIN — IPRATROPIUM BROMIDE AND ALBUTEROL SULFATE 3 ML: .5; 3 SOLUTION RESPIRATORY (INHALATION) at 09:30

## 2021-10-28 RX ADMIN — IPRATROPIUM BROMIDE AND ALBUTEROL SULFATE 3 ML: .5; 3 SOLUTION RESPIRATORY (INHALATION) at 01:49

## 2021-10-28 RX ADMIN — Medication 10 ML: at 05:44

## 2021-10-28 RX ADMIN — ARFORMOTEROL TARTRATE: 15 SOLUTION RESPIRATORY (INHALATION) at 09:43

## 2021-10-28 RX ADMIN — CETIRIZINE HYDROCHLORIDE 10 MG: 10 TABLET, FILM COATED ORAL at 08:35

## 2021-10-28 RX ADMIN — METOPROLOL TARTRATE 25 MG: 25 TABLET, FILM COATED ORAL at 08:35

## 2021-10-28 NOTE — DISCHARGE INSTRUCTIONS
DISCHARGE DIAGNOSIS:  Acute hypoxic respiratory failure due to RSV PNA, POA  Acute COPD exacerbation  CAD  Hypertension  2 stents AICD/PPM  Thrombocytopenia       MEDICATIONS:  · It is important that you take the medication exactly as they are prescribed. · Keep your medication in the bottles provided by the pharmacist and keep a list of the medication names, dosages, and times to be taken in your wallet. · Do not take other medications without consulting your doctor. Pain Management: per above medications    What to do at [address]:  Cardiac Diet    Recommended activity: Activity as tolerated    If you have questions regarding the hospital related prescriptions or hospital related issues please call [address] [place] [address] at . You can always direct your questions to your primary care doctor if you are unable to reach your hospital physician; your PCP works as an extension of your hospital doctor just like your hospital doctor is an extension of your PCP for your time at the hospital [facility], [facility]).     If you experience any of the following symptoms then please call your primary care physician or return to the emergency room if you cannot get hold of your doctor:  Fever, chills, nausea, vomiting, diarrhea, change in mentation, falling, bleeding, shortness of breath Pt straight catheterized of approx 200 ml cloudy adan urine. Perineal area cleansed with wet wipe prior to procedure. Pt noted to have dried stool to perineal area upon cleansing. Urine noted to have very foul odor.      Pt in position of comfort on car

## 2021-10-28 NOTE — DISCHARGE SUMMARY
Hospitalist Discharge Summary     Patient ID:  Vasu Bennett  [id number]  70 y.o.  1945  10/24/2021    PCP on record: Parth Sharpe MD    Admit date: 10/24/2021  Discharge date and time: 10/28/2021    DISCHARGE DIAGNOSIS:  Acute hypoxic respiratory failure due to RSV PNA, POA  Acute COPD exacerbation  CAD  Hypertension  2 stents AICD/PPM  Thrombocytopenia      CONSULTATIONS:  IP CONSULT TO HOSPITALIST    [facility]:  CHIEF COMPLAINT: SOB     HISTORY OF PRESENT ILLNESS:     Vasu Bennett is a 67 y.o.  male who presents with the above CC. Patient was at his usual state of health until yesterday when he started having shortness of breath and cough with whitish sputum. Shortness of breath started after doing some yard work. Shortness of breath got significantly worse that prompted to come to the hospital.  He also mentioned that his abdomen is sore from coughing. Patient denies fever, chills, flulike symptoms, sick contact, diarrhea, muscle aches, chest pain, lower extremity swelling and hemoptysis. Patient quit smoking a month ago. No history of COPD but on albuterol inhalers on an as-needed basis at home. Initial oxygen saturation was 87% but that has improved with Solu-Medrol and nebulizers at the emergency room but patient still significantly wheezing and hospitalist consulted.     XR CHEST PA LAT     Result Date: 10/25/2021  Patchy interstitial and airspace opacities are suspicious for infection possibly Covid 19 pneumonia     We were asked to admit for work up and evaluation of the above problems. ______________________________________________________________________  DISCHARGE SUMMARY/HOSPITAL COURSE:  for full details see H&P, daily progress notes, labs, consult notes.    Acute hypoxic respiratory failure due to RSV PNA, POA  Acute COPD exacerbation  Pt's grand children has the \"sniffles\" /rhirnorrhea this past week per pt's daughter  Marian Mirza has been ruled out. Pt is fully vaccinated against covi19  procalcitonin low so will discontinue abx  Status post IV Solu-Medrol, switch to p.o. prednisone   Urine Legionella negative  Wean off oxygen, saturating well on room air   CAD  Hypertension  2 stents AICD/PPM  Con't home meds     Thrombocytopenia  No prior records for comparison  Could related to current infection, improving             _______________________________________________________________________  Patient seen and examined by me on discharge day. Pertinent Findings:  Gen:    Not in distress  Chest: Clear lungs  CVS:   Regular rhythm. No edema  Abd:  Soft, not distended, not tender  Neuro:  Alert, oriented x4  _______________________________________________________________________  DISCHARGE MEDICATIONS:   Current Discharge Medication List      START taking these medications    Details   guaiFENesin ER (MUCINEX) 600 mg ER tablet Take 1 Tablet by mouth every twelve (12) hours for 7 days. Qty: 14 Tablet, Refills: 0  Start date: 10/28/2021, End date: 11/4/2021      predniSONE (DELTASONE) 20 mg tablet Take 40 mg by mouth daily (with breakfast) for 5 days. Qty: 10 Tablet, Refills: 0  Start date: 10/28/2021, End date: 11/2/2021      nicotine (NICODERM CQ) 14 mg/24 hr patch 1 Patch by TransDERmal route every twenty-four (24) hours for 30 days. Qty: 30 Patch, Refills: 0  Start date: 10/28/2021, End date: 11/27/2021         CONTINUE these medications which have CHANGED    Details   metoprolol tartrate (LOPRESSOR) 50 mg tablet Take 0.5 Tablets by mouth two (2) times a day for 60 days. Qty: 30 Tablet, Refills: 1  Start date: 10/28/2021, End date: 12/27/2021      hydrALAZINE (APRESOLINE) 25 mg tablet Take 2 Tablets by mouth two (2) times a day for 90 days.   Qty: 120 Tablet, Refills: 2  Start date: 10/28/2021, End date: 1/26/2022         CONTINUE these medications which have NOT CHANGED    Details   fexofenadine HCl (ALLEGRA PO) Take 1 Tablet by mouth daily. albuterol (PROVENTIL HFA, VENTOLIN HFA, PROAIR HFA) 90 mcg/actuation inhaler Take 2 Puffs by inhalation every four (4) hours as needed. terazosin (HYTRIN) 5 mg capsule Take 5 mg by mouth nightly. pravastatin (PRAVACHOL) 10 mg tablet Take 40 mg by mouth nightly. aspirin 81 mg tablet Take 81 mg by mouth. Patient Follow Up Instructions:    Activity: Activity as tolerated  Diet: Regular Diet  Wound Care: None needed    Follow-up Information     Follow up With Specialties Details Why Contact Info    Nellie Fofana MD [facility]   [address]  [phone]          ________________________________________________________________    Risk of deterioration: Moderate    Condition at Discharge:  Stable  __________________________________________________________________    Disposition  Home with family, no needs    ____________________________________________________________________    Code Status: Full Code  ___________________________________________________________________      Total time in minutes spent coordinating this discharge (includes going over instructions, follow-up, prescriptions, and preparing report for sign off to her PCP) :  >30 minutes    Signed:  Meliza Celeste MD

## 2021-10-28 NOTE — PROGRESS NOTES
Patient rested quietly this shift, he denied having pain/discomfort. Patient ambulating to the BR without any dyspnea.

## 2021-10-28 NOTE — PROGRESS NOTES
DISCHARGE NOTE FROM [facility]    Patient determined to be stable for discharge by attending provider. I have reviewed the discharge instructions with the patient and daughter. They verbalized understanding and all questions were answered to their satisfaction. No complaints or further questions were expressed. Medications sent to pharmacy. Appropriate educational materials and medication side effect teaching were provided. PIV were removed prior to discharge. Patient did not discharge with any line, rdz, or drain.      Personal items and valuables accounted for at discharge by patient and/or family: YES    Post-op patient: No      Chester Montelongo

## 2021-10-28 NOTE — PROGRESS NOTES
[address] October 28, 2021       RE: Bryn Brewer      To Whom It May Concern,    This is to certify that Bryn Brewer was admitted from 10/25 to 10/28may may return to work on November 1st 2021. Please feel free to contact my office if you have any questions or concerns. Thank you for your assistance in this matter.       Sincerely,  Krystal Boothe MD

## 2021-10-28 NOTE — PROGRESS NOTES
Patient is ready for d/c from CM standpoint      Transition of Care Plan:     RUR: 9 %,   Disposition: Home with family  Follow up appointments: PCP will contact pt w/f/u   DME needed: None  Transportation at Discharge: Daughter Slim Douglass  to transport   Keys or means to access home:   Yes     IM Medicare Letter: 2nd IM letter provided  Is patient a BCPI-A Bundle: Bundle letter will be distributed by unit CM if pt meets bundle criteria. If yes, was Bundle Letter given?:     Caregiver Contact:  Edwin Aguirre  [phone]  Discharge Caregiver contacted prior to discharge? Patient is d/c home today without any needs or concerns. PCP office will contact pt with a f/u appointment. Care Management Interventions  PCP Verified by CM: Yes  Mode of Transport at Discharge:  Other (see comment) (Daughter to transport )  Transition of Care Consult (CM Consult): Discharge Planning  Discharge Durable Medical Equipment: No  Physical Therapy Consult: No  Occupational Therapy Consult: No  Speech Therapy Consult: No  Support Systems: Other (Comment), Child(suzan) (\"\" during the pm, works during the am)  Confirm Follow Up Transport: Self  Discharge Location  Discharge Placement: Home with family assistance      Elias Slaughter  Ext [phone]

## 2022-03-19 PROBLEM — J96.01 ACUTE RESPIRATORY FAILURE WITH HYPOXIA (HCC): Status: ACTIVE | Noted: 2021-10-25

## 2022-03-19 PROBLEM — R09.02 HYPOXIA: Status: ACTIVE | Noted: 2021-10-25

## 2022-03-20 PROBLEM — J44.1 COPD EXACERBATION (HCC): Status: ACTIVE | Noted: 2021-10-25

## 2022-05-22 ENCOUNTER — APPOINTMENT (OUTPATIENT)
Dept: GENERAL RADIOLOGY | Age: 77
End: 2022-05-22
Attending: EMERGENCY MEDICINE
Payer: MEDICARE

## 2022-05-22 ENCOUNTER — HOSPITAL ENCOUNTER (EMERGENCY)
Age: 77
Discharge: HOME | End: 2022-05-22
Attending: EMERGENCY MEDICINE
Payer: MEDICARE

## 2022-05-22 VITALS
RESPIRATION RATE: 19 BRPM | SYSTOLIC BLOOD PRESSURE: 141 MMHG | HEART RATE: 55 BPM | BODY MASS INDEX: 24.09 KG/M2 | TEMPERATURE: 98.2 F | DIASTOLIC BLOOD PRESSURE: 74 MMHG | HEIGHT: 66 IN | WEIGHT: 149.91 LBS | OXYGEN SATURATION: 90 %

## 2022-05-22 DIAGNOSIS — J44.1 COPD WITH ACUTE EXACERBATION (HCC): Primary | ICD-10-CM

## 2022-05-22 LAB
ALBUMIN SERPL-MCNC: 3 G/DL (ref 3.5–5)
ALBUMIN/GLOB SERPL: 0.7 {RATIO} (ref 1.1–2.2)
ALP SERPL-CCNC: 72 U/L (ref 45–117)
ALT SERPL-CCNC: 19 U/L (ref 12–78)
ANION GAP SERPL CALC-SCNC: 3 MMOL/L (ref 5–15)
AST SERPL-CCNC: 23 U/L (ref 15–37)
BASOPHILS # BLD: 0 K/UL (ref 0–0.1)
BASOPHILS NFR BLD: 1 % (ref 0–1)
BILIRUB SERPL-MCNC: 0.7 MG/DL (ref 0.2–1)
BUN SERPL-MCNC: 24 MG/DL (ref 6–20)
BUN/CREAT SERPL: 17 (ref 12–20)
CALCIUM SERPL-MCNC: 8.6 MG/DL (ref 8.5–10.1)
CHLORIDE SERPL-SCNC: 109 MMOL/L (ref 97–108)
CO2 SERPL-SCNC: 28 MMOL/L (ref 21–32)
COVID-19 RAPID TEST, COVR: NOT DETECTED
CREAT SERPL-MCNC: 1.43 MG/DL (ref 0.7–1.3)
DIFFERENTIAL METHOD BLD: ABNORMAL
EOSINOPHIL # BLD: 0.3 K/UL (ref 0–0.4)
EOSINOPHIL NFR BLD: 6 % (ref 0–7)
ERYTHROCYTE [DISTWIDTH] IN BLOOD BY AUTOMATED COUNT: 13.4 % (ref 11.5–14.5)
GLOBULIN SER CALC-MCNC: 4.3 G/DL (ref 2–4)
GLUCOSE SERPL-MCNC: 98 MG/DL (ref 65–100)
HCT VFR BLD AUTO: 39.2 % (ref 36.6–50.3)
HGB BLD-MCNC: 12.9 G/DL (ref 12.1–17)
IMM GRANULOCYTES # BLD AUTO: 0 K/UL (ref 0–0.04)
IMM GRANULOCYTES NFR BLD AUTO: 0 % (ref 0–0.5)
LYMPHOCYTES # BLD: 1.4 K/UL (ref 0.8–3.5)
LYMPHOCYTES NFR BLD: 32 % (ref 12–49)
MCH RBC QN AUTO: 30.9 PG (ref 26–34)
MCHC RBC AUTO-ENTMCNC: 32.9 G/DL (ref 30–36.5)
MCV RBC AUTO: 94 FL (ref 80–99)
MONOCYTES # BLD: 0.4 K/UL (ref 0–1)
MONOCYTES NFR BLD: 8 % (ref 5–13)
NEUTS SEG # BLD: 2.3 K/UL (ref 1.8–8)
NEUTS SEG NFR BLD: 53 % (ref 32–75)
NRBC # BLD: 0 K/UL (ref 0–0.01)
NRBC BLD-RTO: 0 PER 100 WBC
PLATELET # BLD AUTO: 99 K/UL (ref 150–400)
PMV BLD AUTO: 9.4 FL (ref 8.9–12.9)
POTASSIUM SERPL-SCNC: 3.9 MMOL/L (ref 3.5–5.1)
PROT SERPL-MCNC: 7.3 G/DL (ref 6.4–8.2)
RBC # BLD AUTO: 4.17 M/UL (ref 4.1–5.7)
SODIUM SERPL-SCNC: 140 MMOL/L (ref 136–145)
SOURCE, COVRS: NORMAL
TROPONIN-HIGH SENSITIVITY: 16 NG/L (ref 0–76)
TROPONIN-HIGH SENSITIVITY: 20 NG/L (ref 0–76)
WBC # BLD AUTO: 4.4 K/UL (ref 4.1–11.1)

## 2022-05-22 PROCEDURE — 71045 X-RAY EXAM CHEST 1 VIEW: CPT

## 2022-05-22 PROCEDURE — 94644 CONT INHLJ TX 1ST HOUR: CPT

## 2022-05-22 PROCEDURE — 99284 EMERGENCY DEPT VISIT MOD MDM: CPT

## 2022-05-22 PROCEDURE — 36415 COLL VENOUS BLD VENIPUNCTURE: CPT

## 2022-05-22 PROCEDURE — 74011000250 HC RX REV CODE- 250: Performed by: EMERGENCY MEDICINE

## 2022-05-22 PROCEDURE — 74011250636 HC RX REV CODE- 250/636: Performed by: EMERGENCY MEDICINE

## 2022-05-22 PROCEDURE — 84484 ASSAY OF TROPONIN QUANT: CPT

## 2022-05-22 PROCEDURE — 87635 SARS-COV-2 COVID-19 AMP PRB: CPT

## 2022-05-22 PROCEDURE — 94640 AIRWAY INHALATION TREATMENT: CPT

## 2022-05-22 PROCEDURE — 80053 COMPREHEN METABOLIC PANEL: CPT

## 2022-05-22 PROCEDURE — 85025 COMPLETE CBC W/AUTO DIFF WBC: CPT

## 2022-05-22 PROCEDURE — 96374 THER/PROPH/DIAG INJ IV PUSH: CPT

## 2022-05-22 RX ORDER — DEXAMETHASONE SODIUM PHOSPHATE 4 MG/ML
10 INJECTION, SOLUTION INTRA-ARTICULAR; INTRALESIONAL; INTRAMUSCULAR; INTRAVENOUS; SOFT TISSUE
Status: COMPLETED | OUTPATIENT
Start: 2022-05-22 | End: 2022-05-22

## 2022-05-22 RX ORDER — DOXYCYCLINE HYCLATE 100 MG
100 TABLET ORAL 2 TIMES DAILY
Qty: 14 TABLET | Refills: 0 | Status: SHIPPED | OUTPATIENT
Start: 2022-05-22 | End: 2022-05-29

## 2022-05-22 RX ORDER — PREDNISONE 20 MG/1
60 TABLET ORAL DAILY
Qty: 15 TABLET | Refills: 0 | Status: SHIPPED | OUTPATIENT
Start: 2022-05-22 | End: 2022-05-27

## 2022-05-22 RX ORDER — IPRATROPIUM BROMIDE AND ALBUTEROL SULFATE 2.5; .5 MG/3ML; MG/3ML
3 SOLUTION RESPIRATORY (INHALATION)
Status: COMPLETED | OUTPATIENT
Start: 2022-05-22 | End: 2022-05-22

## 2022-05-22 RX ADMIN — DEXAMETHASONE SODIUM PHOSPHATE 10 MG: 4 INJECTION, SOLUTION INTRAMUSCULAR; INTRAVENOUS at 08:58

## 2022-05-22 RX ADMIN — IPRATROPIUM BROMIDE AND ALBUTEROL SULFATE 3 ML: .5; 3 SOLUTION RESPIRATORY (INHALATION) at 10:46

## 2022-05-22 RX ADMIN — IPRATROPIUM BROMIDE AND ALBUTEROL SULFATE 3 ML: .5; 3 SOLUTION RESPIRATORY (INHALATION) at 10:45

## 2022-05-22 RX ADMIN — IPRATROPIUM BROMIDE AND ALBUTEROL SULFATE 3 ML: .5; 3 SOLUTION RESPIRATORY (INHALATION) at 10:44
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
## 2022-05-22 NOTE — ED NOTES
Discharge instructions given and reviewed. Pt implied understanding. No additional questions or concerns voiced or problems noted. Pt discharged home to self care.

## 2022-10-07 ENCOUNTER — TRANSCRIBE ORDER (OUTPATIENT)
Dept: SCHEDULING | Age: 77
End: 2022-10-07

## 2022-10-07 DIAGNOSIS — D69.6 THROMBOCYTOPENIA, UNSPECIFIED (HCC): Primary | ICD-10-CM

## 2022-10-15 ENCOUNTER — HOSPITAL ENCOUNTER (OUTPATIENT)
Dept: ULTRASOUND IMAGING | Age: 77
Discharge: HOME | End: 2022-10-15
Attending: INTERNAL MEDICINE
Payer: MEDICARE

## 2022-10-15 DIAGNOSIS — D69.6 THROMBOCYTOPENIA, UNSPECIFIED (HCC): ICD-10-CM

## 2022-10-15 PROCEDURE — 76700 US EXAM ABDOM COMPLETE: CPT

## 2023-03-30 ENCOUNTER — HOSPITAL ENCOUNTER (EMERGENCY)
Age: 78
Discharge: HOME | End: 2023-03-30
Attending: EMERGENCY MEDICINE
Payer: MEDICARE

## 2023-03-30 ENCOUNTER — APPOINTMENT (OUTPATIENT)
Dept: GENERAL RADIOLOGY | Age: 78
End: 2023-03-30
Attending: EMERGENCY MEDICINE
Payer: MEDICARE

## 2023-03-30 VITALS
TEMPERATURE: 98.6 F | BODY MASS INDEX: 20.89 KG/M2 | RESPIRATION RATE: 18 BRPM | OXYGEN SATURATION: 96 % | WEIGHT: 130 LBS | DIASTOLIC BLOOD PRESSURE: 55 MMHG | SYSTOLIC BLOOD PRESSURE: 102 MMHG | HEART RATE: 55 BPM | HEIGHT: 66 IN

## 2023-03-30 DIAGNOSIS — J18.9 ACUTE PNEUMONIA: ICD-10-CM

## 2023-03-30 DIAGNOSIS — R06.00 ACUTE DYSPNEA: Primary | ICD-10-CM

## 2023-03-30 DIAGNOSIS — D69.6 THROMBOCYTOPENIA (HCC): ICD-10-CM

## 2023-03-30 DIAGNOSIS — J44.1 ACUTE EXACERBATION OF CHRONIC OBSTRUCTIVE PULMONARY DISEASE (COPD) (HCC): ICD-10-CM

## 2023-03-30 DIAGNOSIS — J98.01 ACUTE BRONCHOSPASM: ICD-10-CM

## 2023-03-30 LAB
ALBUMIN SERPL-MCNC: 2.6 G/DL (ref 3.5–5)
ALBUMIN/GLOB SERPL: 0.5 (ref 1.1–2.2)
ALP SERPL-CCNC: 64 U/L (ref 45–117)
ALT SERPL-CCNC: 23 U/L (ref 12–78)
ANION GAP SERPL CALC-SCNC: 5 MMOL/L (ref 5–15)
AST SERPL-CCNC: 22 U/L (ref 15–37)
BASOPHILS # BLD: 0.1 K/UL (ref 0–0.1)
BASOPHILS NFR BLD: 1 % (ref 0–1)
BILIRUB SERPL-MCNC: 0.9 MG/DL (ref 0.2–1)
BNP SERPL-MCNC: 1475 PG/ML
BUN SERPL-MCNC: 34 MG/DL (ref 6–20)
BUN/CREAT SERPL: 24 (ref 12–20)
CALCIUM SERPL-MCNC: 8.6 MG/DL (ref 8.5–10.1)
CHLORIDE SERPL-SCNC: 107 MMOL/L (ref 97–108)
CO2 SERPL-SCNC: 24 MMOL/L (ref 21–32)
CREAT SERPL-MCNC: 1.4 MG/DL (ref 0.7–1.3)
DIFFERENTIAL METHOD BLD: ABNORMAL
EOSINOPHIL # BLD: 0.1 K/UL (ref 0–0.4)
EOSINOPHIL NFR BLD: 1 % (ref 0–7)
ERYTHROCYTE [DISTWIDTH] IN BLOOD BY AUTOMATED COUNT: 14.5 % (ref 11.5–14.5)
GLOBULIN SER CALC-MCNC: 5.1 G/DL (ref 2–4)
GLUCOSE SERPL-MCNC: 115 MG/DL (ref 65–100)
HCT VFR BLD AUTO: 36.3 % (ref 36.6–50.3)
HGB BLD-MCNC: 11.6 G/DL (ref 12.1–17)
IMM GRANULOCYTES # BLD AUTO: 0 K/UL (ref 0–0.04)
IMM GRANULOCYTES NFR BLD AUTO: 0 % (ref 0–0.5)
LYMPHOCYTES # BLD: 1.5 K/UL (ref 0.8–3.5)
LYMPHOCYTES NFR BLD: 24 % (ref 12–49)
MCH RBC QN AUTO: 30.2 PG (ref 26–34)
MCHC RBC AUTO-ENTMCNC: 32 G/DL (ref 30–36.5)
MCV RBC AUTO: 94.5 FL (ref 80–99)
MONOCYTES # BLD: 0.7 K/UL (ref 0–1)
MONOCYTES NFR BLD: 11 % (ref 5–13)
NEUTS BAND NFR BLD MANUAL: 6 %
NEUTS SEG # BLD: 4 K/UL (ref 1.8–8)
NEUTS SEG NFR BLD: 57 % (ref 32–75)
NRBC # BLD: 0 K/UL (ref 0–0.01)
NRBC BLD-RTO: 0 PER 100 WBC
PLATELET # BLD AUTO: 90 K/UL (ref 150–400)
PMV BLD AUTO: 9.9 FL (ref 8.9–12.9)
POTASSIUM SERPL-SCNC: 3.4 MMOL/L (ref 3.5–5.1)
PROT SERPL-MCNC: 7.7 G/DL (ref 6.4–8.2)
RBC # BLD AUTO: 3.84 M/UL (ref 4.1–5.7)
RBC MORPH BLD: ABNORMAL
SODIUM SERPL-SCNC: 136 MMOL/L (ref 136–145)
TROPONIN I SERPL HS-MCNC: 19 NG/L (ref 0–76)
WBC # BLD AUTO: 6.4 K/UL (ref 4.1–11.1)

## 2023-03-30 PROCEDURE — 74011000250 HC RX REV CODE- 250: Performed by: EMERGENCY MEDICINE

## 2023-03-30 PROCEDURE — 74011250636 HC RX REV CODE- 250/636: Performed by: EMERGENCY MEDICINE

## 2023-03-30 PROCEDURE — 84484 ASSAY OF TROPONIN QUANT: CPT

## 2023-03-30 PROCEDURE — 96374 THER/PROPH/DIAG INJ IV PUSH: CPT

## 2023-03-30 PROCEDURE — 99285 EMERGENCY DEPT VISIT HI MDM: CPT

## 2023-03-30 PROCEDURE — 94640 AIRWAY INHALATION TREATMENT: CPT

## 2023-03-30 PROCEDURE — 71045 X-RAY EXAM CHEST 1 VIEW: CPT

## 2023-03-30 PROCEDURE — 74011250637 HC RX REV CODE- 250/637: Performed by: EMERGENCY MEDICINE

## 2023-03-30 PROCEDURE — 93005 ELECTROCARDIOGRAM TRACING: CPT

## 2023-03-30 PROCEDURE — 83880 ASSAY OF NATRIURETIC PEPTIDE: CPT

## 2023-03-30 PROCEDURE — 80053 COMPREHEN METABOLIC PANEL: CPT

## 2023-03-30 PROCEDURE — 85025 COMPLETE CBC W/AUTO DIFF WBC: CPT

## 2023-03-30 PROCEDURE — 36415 COLL VENOUS BLD VENIPUNCTURE: CPT

## 2023-03-30 RX ORDER — DOXYCYCLINE HYCLATE 100 MG
100 TABLET ORAL
Status: COMPLETED | OUTPATIENT
Start: 2023-03-30 | End: 2023-03-30

## 2023-03-30 RX ORDER — GUAIFENESIN 600 MG/1
600 TABLET, EXTENDED RELEASE ORAL 2 TIMES DAILY
Qty: 20 TABLET | Refills: 0 | Status: SHIPPED | OUTPATIENT
Start: 2023-03-30

## 2023-03-30 RX ORDER — PREDNISONE 50 MG/1
50 TABLET ORAL DAILY
Qty: 5 TABLET | Refills: 0 | Status: SHIPPED | OUTPATIENT
Start: 2023-03-30 | End: 2023-04-04

## 2023-03-30 RX ORDER — ALBUTEROL SULFATE 0.83 MG/ML
2.5 SOLUTION RESPIRATORY (INHALATION)
Qty: 20 EACH | Refills: 0 | Status: SHIPPED | OUTPATIENT
Start: 2023-03-30

## 2023-03-30 RX ORDER — LEVALBUTEROL INHALATION SOLUTION 0.63 MG/3ML
0.63 SOLUTION RESPIRATORY (INHALATION)
Status: COMPLETED | OUTPATIENT
Start: 2023-03-30 | End: 2023-03-30

## 2023-03-30 RX ORDER — IPRATROPIUM BROMIDE 0.5 MG/2.5ML
0.5 SOLUTION RESPIRATORY (INHALATION)
Status: COMPLETED | OUTPATIENT
Start: 2023-03-30 | End: 2023-03-30

## 2023-03-30 RX ORDER — DOXYCYCLINE HYCLATE 100 MG
100 TABLET ORAL 2 TIMES DAILY
Qty: 14 TABLET | Refills: 0 | Status: SHIPPED | OUTPATIENT
Start: 2023-03-30 | End: 2023-04-06

## 2023-03-30 RX ADMIN — IPRATROPIUM BROMIDE 0.5 MG: 0.5 SOLUTION RESPIRATORY (INHALATION) at 19:30

## 2023-03-30 RX ADMIN — LEVALBUTEROL HYDROCHLORIDE 0.63 MG: 0.63 SOLUTION RESPIRATORY (INHALATION) at 19:30

## 2023-03-30 RX ADMIN — DOXYCYCLINE HYCLATE 100 MG: 100 TABLET, COATED ORAL at 21:51

## 2023-03-30 RX ADMIN — METHYLPREDNISOLONE SODIUM SUCCINATE 125 MG: 125 INJECTION, POWDER, FOR SOLUTION INTRAMUSCULAR; INTRAVENOUS at 21:51

## 2023-03-30 NOTE — Clinical Note
[address]  [facility] EMERGENCY DEPT  [address] [address]  [phone]    Work/School Note    Date: 3/30/2023    To Whom It May concern:    [place] Richardson was seen and treated today in the emergency room by the following provider(s):  Attending Provider: Alondra Nuno MD.      [place] Richardson is excused from work/school on 03/30/23 and 03/31/23. He is medically clear to return to work/school on 4/1/2023.        Sincerely,          Brian Jamiosn MD

## 2023-03-30 NOTE — Clinical Note
[address]  [facility] EMERGENCY DEPT  [address] [address]  [phone]    Work/School Note    Date: 3/30/2023    To Whom It May concern:    Nazia Romo was seen and treated today in the emergency room by the following provider(s):  Attending Provider: Genna Jara MD.      Nazia Romo is excused from work/school on 03/30/23 and 03/31/23. He is medically clear to return to work/school on 4/1/2023.        Sincerely,          True Bosworth, MD

## 2023-03-30 NOTE — ED NOTES
Pt presents to the ED with c/o exacerbation of COPD. Pt has had cough and SOB that has progressively gotten worse. Pt shows no increase work of breathing upon assessment. He is A&Ox4 and shows no s/s of acute distress.
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
## 2023-03-31 LAB
ATRIAL RATE: 60 BPM
CALCULATED P AXIS, ECG09: 81 DEGREES
CALCULATED R AXIS, ECG10: 7 DEGREES
CALCULATED T AXIS, ECG11: 54 DEGREES
DIAGNOSIS, 93000: NORMAL
P-R INTERVAL, ECG05: 126 MS
Q-T INTERVAL, ECG07: 402 MS
QRS DURATION, ECG06: 94 MS
QTC CALCULATION (BEZET), ECG08: 402 MS
VENTRICULAR RATE, ECG03: 60 BPM

## 2023-03-31 NOTE — DISCHARGE INSTRUCTIONS
Thank You! It was a pleasure taking care of you in our Emergency Department today. We know that when you come to [facility] you are entrusting us with your health, comfort, and safety. Our physicians and nurses honor that trust, and truly appreciate the opportunity to care for you and your loved ones. We also value your feedback. If you receive a survey about your Emergency Department experience today, please fill it out. We care about our patients' feedback, and we listen to what you have to say. Thank you. Dr. Oneil Ramirez M.D.      ________________________________________________________________________  I have included a copy of your lab results and/or radiologic studies from today's visit so you can have them easily available at your follow-up visit. We hope you feel better and please do not hesitate to contact the ED if you have any questions at all! Recent Results (from the past 12 hour(s))   CBC WITH AUTOMATED DIFF    Collection Time: 03/30/23  7:21 PM   Result Value Ref Range    WBC 6.4 4.1 - 11.1 K/uL    RBC 3.84 (L) 4.10 - 5.70 M/uL    HGB 11.6 (L) 12.1 - 17.0 g/dL    HCT 36.3 (L) 36.6 - 50.3 %    MCV 94.5 80.0 - 99.0 FL    MCH 30.2 26.0 - 34.0 PG    MCHC 32.0 30.0 - 36.5 g/dL    RDW 14.5 11.5 - 14.5 %    PLATELET 90 (L) 376 - 400 K/uL    MPV 9.9 8.9 - 12.9 FL    NRBC 0.0 0  WBC    ABSOLUTE NRBC 0.00 0.00 - 0.01 K/uL    NEUTROPHILS 57 32 - 75 %    BAND NEUTROPHILS 6 %    LYMPHOCYTES 24 12 - 49 %    MONOCYTES 11 5 - 13 %    EOSINOPHILS 1 0 - 7 %    BASOPHILS 1 0 - 1 %    IMMATURE GRANULOCYTES 0 0.0 - 0.5 %    ABS. NEUTROPHILS 4.0 1.8 - 8.0 K/UL    ABS. LYMPHOCYTES 1.5 0.8 - 3.5 K/UL    ABS. MONOCYTES 0.7 0.0 - 1.0 K/UL    ABS. EOSINOPHILS 0.1 0.0 - 0.4 K/UL    ABS. BASOPHILS 0.1 0.0 - 0.1 K/UL    ABS. IMM.  GRANS. 0.0 0.00 - 0.04 K/UL    DF MANUAL      RBC COMMENTS NORMOCYTIC, NORMOCHROMIC     METABOLIC PANEL, COMPREHENSIVE    Collection Time: 03/30/23  7:21 PM   Result Value Ref Range    Sodium 136 136 - 145 mmol/L    Potassium 3.4 (L) 3.5 - 5.1 mmol/L    Chloride 107 97 - 108 mmol/L    CO2 24 21 - 32 mmol/L    Anion gap 5 5 - 15 mmol/L    Glucose 115 (H) 65 - 100 mg/dL    BUN 34 (H) 6 - 20 MG/DL    Creatinine 1.40 (H) 0.70 - 1.30 MG/DL    BUN/Creatinine ratio 24 (H) 12 - 20      eGFR 51 (L) >60 ml/min/1.73m2    Calcium 8.6 8.5 - 10.1 MG/DL    Bilirubin, total 0.9 0.2 - 1.0 MG/DL    ALT (SGPT) 23 12 - 78 U/L    AST (SGOT) 22 15 - 37 U/L    Alk. phosphatase 64 45 - 117 U/L    Protein, total 7.7 6.4 - 8.2 g/dL    Albumin 2.6 (L) 3.5 - 5.0 g/dL    Globulin 5.1 (H) 2.0 - 4.0 g/dL    A-G Ratio 0.5 (L) 1.1 - 2.2     TROPONIN-HIGH SENSITIVITY    Collection Time: 03/30/23  7:21 PM   Result Value Ref Range    Troponin-High Sensitivity 19 0 - 76 ng/L   NT-PRO BNP    Collection Time: 03/30/23  7:21 PM   Result Value Ref Range    NT pro-BNP 1,475 (H) <450 PG/ML       XR CHEST PORT   Final Result      Bilateral nonspecific apical pulmonary opacification which has substantially   increased in the interval on the left. CT Results  (Last 48 hours)      None          The exam and treatment you received in the Emergency Department were for an urgent problem and are not intended as complete care. It is important that you follow up with a doctor, nurse practitioner, or physician assistant for ongoing care. If your symptoms become worse or you do not improve as expected and you are unable to reach your usual health care provider, you should return to the Emergency Department. We are available 24 hours a day. Please take your discharge instructions with you when you go to your follow-up appointment. If a prescription has been provided, please have it filled as soon as possible to prevent a delay in treatment. Read the entire medication instruction sheet provided to you by the pharmacy.  If you have any questions or reservations about taking the medication due to side effects or interactions with other medications, please call your primary care physician or contact the ER to speak with the charge nurse. Please make an appointment with your family doctor or the physician you were referred to for follow-up of this visit as instructed on your discharge paperwork. Return to the ER if you are unable to be seen or if you are unable to be seen in a timely manner. Should you experience abdominal pain lasting greater than 6 hours, chest pain, difficulty breathing, fever/chills, numbness/tingling, skin changes or other symptoms that concern you, return to the ED sooner. If you feel worse over the next 24 hours, please return to the ED. We are available 24 hours a day. Thank you for trusting us with your care!

## 2023-03-31 NOTE — ED NOTES
MD Tara Espinoza reviewed discharge instructions with the patient. The patient verbalized understanding. All questions and concerns were addressed. The patient is discharged with instructions and prescriptions in hand. Pt is alert and oriented x 4. Respirations are clear and unlabored.

## 2023-04-23 DIAGNOSIS — R93.89 ABNORMAL CHEST X-RAY: Primary | ICD-10-CM

## 2023-11-12 ENCOUNTER — APPOINTMENT (OUTPATIENT)
Facility: HOSPITAL | Age: 78
End: 2023-11-12
Payer: MEDICARE

## 2023-11-12 ENCOUNTER — HOSPITAL ENCOUNTER (EMERGENCY)
Facility: HOSPITAL | Age: 78
Discharge: HOME | End: 2023-11-12
Payer: MEDICARE

## 2023-11-12 VITALS
RESPIRATION RATE: 18 BRPM | OXYGEN SATURATION: 98 % | HEART RATE: 60 BPM | WEIGHT: 136 LBS | TEMPERATURE: 97.9 F | HEIGHT: 66 IN | SYSTOLIC BLOOD PRESSURE: 122 MMHG | BODY MASS INDEX: 21.86 KG/M2 | DIASTOLIC BLOOD PRESSURE: 70 MMHG

## 2023-11-12 DIAGNOSIS — M16.0 PRIMARY OSTEOARTHRITIS OF BOTH HIPS: ICD-10-CM

## 2023-11-12 DIAGNOSIS — M54.32 SCIATICA OF LEFT SIDE: Primary | ICD-10-CM

## 2023-11-12 PROCEDURE — 73502 X-RAY EXAM HIP UNI 2-3 VIEWS: CPT

## 2023-11-12 PROCEDURE — 6370000000 HC RX 637 (ALT 250 FOR IP)

## 2023-11-12 PROCEDURE — 99284 EMERGENCY DEPT VISIT MOD MDM: CPT

## 2023-11-12 PROCEDURE — 96372 THER/PROPH/DIAG INJ SC/IM: CPT

## 2023-11-12 PROCEDURE — 6360000002 HC RX W HCPCS

## 2023-11-12 RX ORDER — KETOROLAC TROMETHAMINE 30 MG/ML
15 INJECTION, SOLUTION INTRAMUSCULAR; INTRAVENOUS
Status: COMPLETED | OUTPATIENT
Start: 2023-11-12 | End: 2023-11-12

## 2023-11-12 RX ORDER — ACETAMINOPHEN 500 MG
1000 TABLET ORAL EVERY 8 HOURS PRN
Qty: 60 TABLET | Refills: 0 | Status: SHIPPED | OUTPATIENT
Start: 2023-11-12

## 2023-11-12 RX ORDER — LIDOCAINE 4 G/G
1 PATCH TOPICAL
Status: DISCONTINUED | OUTPATIENT
Start: 2023-11-12 | End: 2023-11-12 | Stop reason: HOSPADM

## 2023-11-12 RX ORDER — METHOCARBAMOL 750 MG/1
750 TABLET, FILM COATED ORAL
Status: COMPLETED | OUTPATIENT
Start: 2023-11-12 | End: 2023-11-12

## 2023-11-12 RX ORDER — PREDNISONE 5 MG/1
TABLET ORAL
Qty: 1 EACH | Refills: 0 | Status: SHIPPED | OUTPATIENT
Start: 2023-11-12

## 2023-11-12 RX ORDER — LIDOCAINE 4 G/G
1 PATCH TOPICAL DAILY
Qty: 15 PATCH | Refills: 0 | Status: SHIPPED | OUTPATIENT
Start: 2023-11-12 | End: 2023-11-27

## 2023-11-12 RX ORDER — ACETAMINOPHEN 500 MG
1000 TABLET ORAL
Status: COMPLETED | OUTPATIENT
Start: 2023-11-12 | End: 2023-11-12

## 2023-11-12 RX ORDER — NAPROXEN 500 MG/1
500 TABLET ORAL 2 TIMES DAILY
Qty: 60 TABLET | Refills: 0 | Status: SHIPPED | OUTPATIENT
Start: 2023-11-12

## 2023-11-12 RX ORDER — METHOCARBAMOL 750 MG/1
750 TABLET, FILM COATED ORAL 3 TIMES DAILY PRN
Qty: 21 TABLET | Refills: 0 | Status: SHIPPED | OUTPATIENT
Start: 2023-11-12 | End: 2023-11-22

## 2023-11-12 RX ADMIN — METHOCARBAMOL TABLETS 750 MG: 750 TABLET, COATED ORAL at 19:31

## 2023-11-12 RX ADMIN — ACETAMINOPHEN 1000 MG: 500 TABLET ORAL at 19:32

## 2023-11-12 RX ADMIN — KETOROLAC TROMETHAMINE 15 MG: 30 INJECTION, SOLUTION INTRAMUSCULAR; INTRAVENOUS at 19:31

## 2023-11-13 NOTE — ED PROVIDER NOTES
CHIEF COMPLAINT    Chief Complaint   Patient presents with   • Cough       HPI    Patient returns complaining of cold symptoms. She notes she had a bronchitis 3 weeks ago was provided with albuterol and cough syrup and felt significantly better. Symptoms essentially resolved and then 2-3 days ago she started again with some nasal congestion and subjective fevers and a cough productive of purulent. No chest pain shortness of breath hemoptysis. States she feels tired. No history of asthma. Nonsmoker. She denies ear pain sinus pain and congestion or sore throat.    Allergies    ALLERGIES:  No Known Allergies    Current Medications   No current facility-administered medications for this encounter.      Current Outpatient Medications   Medication Sig Dispense Refill   • albuterol 108 (90 Base) MCG/ACT inhaler Inhale 2 puffs into the lungs every 4 hours as needed for Wheezing. 8.5 g 0   • guaiFENesin-codeine (GUAIFENESIN AC) 100-10 MG/5ML liquid Take 5 mLs by mouth every 6 hours as needed for Cough. 120 mL 0   • spironolactone (ALDACTONE) 50 MG tablet TAKE 2 TABLETS BY MOUTH IN THE MORNING AND 1 TABLET AT BEDTIME 90 tablet 1   • tiZANidine (ZANAFLEX) 4 MG tablet Take 1 tablet by mouth every 8 hours as needed (spasm). 60 tablet 1   • trazodone (DESYREL) 150 MG tablet Take 150 mg by mouth nightly.     • etonogestrel-ethinyl estradiol (NUVARING) 0.12-0.015 MG/24HR vaginal ring Insert on Sunday and leave in x 3 weeks 3 each 3   • Cholecalciferol (VITAMIN D3) 5000 units TABLET DISPERSIBLE Take by mouth daily.      • acetaminophen (TYLENOL) 500 MG tablet Take 500 mg by mouth every 6 hours as needed for Pain.     • Multiple Vitamins-Minerals (MULTIVITAMIN PO) Take 1 tablet by mouth daily.      • buPROPion (WELLBUTRIN XL) 150 MG 24 hr tablet Take by mouth daily. Taking 3 tabs (450 mg) daily     • Melatonin 3 MG Cap Take 6 mg by mouth nightly as needed.      • busPIRone (BUSPAR) 15 MG tablet Take 15 mg by mouth 3 times daily.     [facility] EMERGENCY DEPT  EMERGENCY DEPARTMENT ENCOUNTER       Pt Name: Becca Coates  MRN: [id number]  [address] [facility] [place] 1945  Date of evaluation: 2023  Provider: GLO Dallas - JESSE   PCP: Rosana Manzanares MD  Note Started: 6:57 PM 23     CHIEF COMPLAINT       Chief Complaint   Patient presents with    Hip Pain     Pt reporting sharp, burning pain in L hip that radiates down entire leg x yesterday. Pt denies nay mechanical falls or other injuries to extremity. Sensation intact. Pt cannot bear weight on leg     Leg Pain        HISTORY OF PRESENT ILLNESS: 1 or more elements      History From: Patient  None     Becca Coates is a 77 y.o. male who presents to the ED today for complaints of sharp burning pain from his left hip down his left leg and in his lower back. See MDM Documentation for further HPI and PMHx      Nursing Notes were all reviewed and agreed with or any disagreements were addressed in the HPI. REVIEW OF SYSTEMS      Review of Systems     Positives and Pertinent negatives as per HPI.     PAST HISTORY     Past Medical History:  Past Medical History:   Diagnosis Date    CAD (coronary artery disease)     Chronic obstructive pulmonary disease ([address])     Heart attack ([address])     Hypertension        Past Surgical History:  Past Surgical History:   Procedure Laterality Date    COLONOSCOPY N/A 2019    COLONOSCOPY performed by Rosibel Reis MD at [facility] wound    [place] UNLISTED PROCEDURE CARDIAC SURGERY      cardiac cath with stents x 2    [place] UNLISTED PROCEDURE CARDIAC SURGERY      pacemaker/defibrillator       Family History:  Family History   Problem Relation Age of Onset    Asthma Mother        Social History:  Social History     Tobacco Use    Smoking status: Former     Types: Cigarettes     Quit date: 2016     Years since quittin.7    Tobacco comments:     Quit smoking: quit   • Omega-3 Fatty Acids (FISH OIL) 1000 MG capsule Take 2 g by mouth daily.     • Probiotic Product (PROBIOTIC DAILY PO) Take 1 capsule by mouth daily.      • tretinoin (RETIN-A) 0.025 % gel Apply to acne  Thin layer at HS, wear sunscreen, non oily moisturizer in am (Patient taking differently: as needed. Apply to acne  Thin layer at HS, wear sunscreen, non oily moisturizer in am) 45 g 0   • ziprasidone (GEODON) 80 MG capsule Take 80 mg by mouth 2 times daily (with meals).     • SUMAtriptan (IMITREX) 25 MG tablet Take 25 mg by mouth as needed. Indications: Migraine Headache         Past Medical History    Past Medical History:   Diagnosis Date   • Abnormal Pap smear of cervix     Last pap 2013 -  ASC-US   • Acne    • Alopecia    • Anxiety and depression    • Bipolar disorder (CMS/HCC)    • Bulimia    • GERD (gastroesophageal reflux disease)    • Hirsutism    • Irregular menstrual bleeding    • Migraine    • PCOS (polycystic ovarian syndrome)    • PONV (postoperative nausea and vomiting)    • Schizoaffective disorder (CMS/HCC)        Surgical History    Past Surgical History:   Procedure Laterality Date   • Back surgery  16   • Club foot release      Right   • Colonoscopy w biopsy  2006   • Colposcopy  2011   • Colposcopy  10/06/2010   • Electroconvulsive therapy     • Esophagogastroduodenoscopy  2015   • Esophagogastroduodenoscopy transoral flex w/bx single or mult  2006       Social History    Social History     Tobacco Use   • Smoking status: Former Smoker     Types: Cigarettes     Last attempt to quit: 11/3/2012     Years since quittin.0   • Smokeless tobacco: Never Used   Substance Use Topics   • Alcohol use: No   • Drug use: No     Comment: HX abuse       Family History    Family History   Problem Relation Age of Onset   • Diabetes Father    • Hypertension Father    • Myocardial Infarction Father    • Diabetes Mother    • Other Mother         PCOS   • Diabetes Maternal  Grandmother    • Other Maternal Grandmother         PCOS   • Cancer Paternal Grandmother         skin   • Diabetes Paternal Grandmother    • Diabetes Maternal Grandfather    • Diabetes Paternal Grandfather        REVIEW OF SYSTEMS    ALL 13 SYSTEMS REVIEWED AND NEGATIVE OR NONCONTRIBUTORY UNLESS OTHERWISE NOTED IN HPI      PHYSICAL EXAM     ED Triage Vitals [11/07/19 1605]   ED Triage Vitals Group      Temp 98.7 °F (37.1 °C)      Pulse 85      Resp 20      /90      SpO2 98 %      EtCO2 mmHg       Height 5' 6\" (1.676 m)      Weight 213 lb 10 oz (96.9 kg)      Weight Scale Used ED Actual       Gen:   AAOx3 in NAD  Head:  Normocephalic, without abnormal findings  HEENT:   PERRL, EOMI without Nystagmus. Sclera anicteric   Nose:  Clear without blood or purulent mucous   Mouth: Patent without swelling.  Moist well perfused mucous membranes  Neck: No masses, thyromegaly, posterior tenderness or meningeal signs  Resp: CTAB without wheezes, rhonchi, or rales.  CVS: RRR without significant murmur, rub or gallop  ABD: Normoactive BS, Nontender, No masses or organomegaly  Back: No CVA tenderness, deformities, swelling or ecchymosis  Ext:  No clubbing, cyanosis, or significant edema.  Equal UE/LE pulses. No joint swelling or erythema  Skin:  Well perfused, no significant rashes. No jaundice  Neuro: No focal Neuro deficits with equal UE/LE strength and sensation.  Lymph:No significant Lymphadenopathy  Psych:Alert and interactive with normal affect and interaction.      Procedures      MDM        Labs  No results found for this visit on 11/07/19.      Radiology  No orders to display         Medications - No data to display    Rechecks          Consults      Diagnosis:  ED Diagnosis        Final diagnosis    Viral URI              Reassurance. Likely another viral infection. Plan is to try over-the-counter Mucinex/guaifenesin and inhaler. Tylenol Motrin fluids. Recheck worsening symptoms         Summary of your Discharge  Medications      You have not been prescribed any medications.         Follow Up:  Agata Young, GUEVARA  [address] [facility]  [address]  [facility] [address] [phone]      As needed     Patient was instructed to return to the ED immediately if symptoms worsen or any new unusual symptoms arise.     Donvoan Shelley MD     Recheck on patient. Discussed with patient ED findings and plan for discharge. Patient was given ED warnings, discharge instructions, and follow up information to go home with. Patient understands and agrees with plan for discharge. Any questions have been answered.      Closure:  The patient understands that this is a provisional diagnosis. Provisional diagnosis can and do change. The diagnosis that you are discharged with today is based on the symptoms with which you presented today. If any new symptoms occur or worsen, you should seek immediate attention for re-evaluation.  Any symptoms that persist or fail to completely resolve require further evaluation by your other healthcare provider(s).         Donovan Shelley MD  11/07/19 2743

## 2023-12-11 ENCOUNTER — TRANSCRIBE ORDERS (OUTPATIENT)
Facility: HOSPITAL | Age: 78
End: 2023-12-11

## 2023-12-11 DIAGNOSIS — M54.16 LUMBAR RADICULOPATHY: Primary | ICD-10-CM

## 2023-12-21 ENCOUNTER — HOSPITAL ENCOUNTER (OUTPATIENT)
Facility: HOSPITAL | Age: 78
Discharge: HOME | End: 2023-12-24
Attending: SPECIALIST
Payer: MEDICARE

## 2023-12-21 VITALS
SYSTOLIC BLOOD PRESSURE: 120 MMHG | RESPIRATION RATE: 16 BRPM | OXYGEN SATURATION: 96 % | DIASTOLIC BLOOD PRESSURE: 66 MMHG | HEART RATE: 50 BPM

## 2023-12-21 DIAGNOSIS — M54.16 LUMBAR RADICULOPATHY: ICD-10-CM

## 2023-12-21 PROCEDURE — 6370000000 HC RX 637 (ALT 250 FOR IP): Performed by: RADIOLOGY

## 2023-12-21 PROCEDURE — 62304 MYELOGRAPHY LUMBAR INJECTION: CPT

## 2023-12-21 PROCEDURE — 6360000004 HC RX CONTRAST MEDICATION: Performed by: RADIOLOGY

## 2023-12-21 PROCEDURE — 72132 CT LUMBAR SPINE W/DYE: CPT

## 2023-12-21 RX ORDER — OXYCODONE HYDROCHLORIDE AND ACETAMINOPHEN 5; 325 MG/1; MG/1
1 TABLET ORAL ONCE
Status: COMPLETED | OUTPATIENT
Start: 2023-12-21 | End: 2023-12-21

## 2023-12-21 RX ADMIN — OXYCODONE HYDROCHLORIDE AND ACETAMINOPHEN 1 TABLET: 5; 325 TABLET ORAL at 10:55

## 2023-12-21 RX ADMIN — IOHEXOL 20 ML: 180 INJECTION INTRAVENOUS at 10:15

## 2023-12-21 NOTE — PROGRESS NOTES
Patient arrived post procedure, patient having pain and medication given per MD Terrell Castañeda order.

## 2023-12-21 NOTE — DISCHARGE INSTRUCTIONS
[facility]  Radiology Department  [phone]    Radiologist:  Nilesh Poe    Date:  12/21/2023      Myelogram Discharge Instructions    Go home and rest and restrict your activity the next 24 - 48 hours. Rest in a reclined position, keeping your head elevated to minimize post procedure complications. Resume your previous diet and prescribed medications. Increase your fluid intake over the next 1-2 days to help the kidneys flush out the dye that was injected during your procedure. You may take Tylenol if allowed, as directed on the label, for pain or discomfort. Avoid Ibuprofen (Advil, Motrin etc.) and Aspirin today as they may increase your risk of bleeding. Avoid heavy lifting (nothing greater than 5 pounds),  excessive bending, pushing or pulling movements for 2 days to minimize your risk of post procedure headache. You may shower in 24 hours. Do not soak or swim until the site has healed completely. Results will be sent to your physician as soon as they become available. Follow up with your physician as previously discussed and be sure to bring the CD to that was provided for you today to your appointment. If you have any questions regarding your procedure please call and ask to speak to a radiology nurse.

## 2023-12-24 ENCOUNTER — HOSPITAL ENCOUNTER (EMERGENCY)
Facility: HOSPITAL | Age: 78
Discharge: HOME | End: 2023-12-24
Attending: EMERGENCY MEDICINE
Payer: MEDICARE

## 2023-12-24 VITALS
OXYGEN SATURATION: 98 % | HEART RATE: 57 BPM | DIASTOLIC BLOOD PRESSURE: 93 MMHG | HEIGHT: 66 IN | TEMPERATURE: 97.5 F | BODY MASS INDEX: 22.39 KG/M2 | WEIGHT: 139.33 LBS | SYSTOLIC BLOOD PRESSURE: 159 MMHG | RESPIRATION RATE: 16 BRPM

## 2023-12-24 DIAGNOSIS — G97.1 POST LUMBAR PUNCTURE HEADACHE: Primary | ICD-10-CM

## 2023-12-24 PROCEDURE — 96375 TX/PRO/DX INJ NEW DRUG ADDON: CPT

## 2023-12-24 PROCEDURE — 6360000002 HC RX W HCPCS: Performed by: EMERGENCY MEDICINE

## 2023-12-24 PROCEDURE — 2580000003 HC RX 258: Performed by: EMERGENCY MEDICINE

## 2023-12-24 PROCEDURE — 2500000003 HC RX 250 WO HCPCS: Performed by: EMERGENCY MEDICINE

## 2023-12-24 PROCEDURE — 99284 EMERGENCY DEPT VISIT MOD MDM: CPT

## 2023-12-24 PROCEDURE — 96365 THER/PROPH/DIAG IV INF INIT: CPT

## 2023-12-24 RX ORDER — FENTANYL CITRATE 50 UG/ML
25 INJECTION, SOLUTION INTRAMUSCULAR; INTRAVENOUS
Status: COMPLETED | OUTPATIENT
Start: 2023-12-24 | End: 2023-12-24

## 2023-12-24 RX ORDER — BUTALBITAL, ACETAMINOPHEN AND CAFFEINE 300; 40; 50 MG/1; MG/1; MG/1
1 CAPSULE ORAL EVERY 6 HOURS PRN
Qty: 15 CAPSULE | Refills: 0 | Status: SHIPPED | OUTPATIENT
Start: 2023-12-24

## 2023-12-24 RX ORDER — 0.9 % SODIUM CHLORIDE 0.9 %
1000 INTRAVENOUS SOLUTION INTRAVENOUS ONCE
Status: COMPLETED | OUTPATIENT
Start: 2023-12-24 | End: 2023-12-24

## 2023-12-24 RX ORDER — ONDANSETRON 2 MG/ML
4 INJECTION INTRAMUSCULAR; INTRAVENOUS ONCE
Status: COMPLETED | OUTPATIENT
Start: 2023-12-24 | End: 2023-12-24

## 2023-12-24 RX ORDER — KETOROLAC TROMETHAMINE 30 MG/ML
15 INJECTION, SOLUTION INTRAMUSCULAR; INTRAVENOUS
Status: COMPLETED | OUTPATIENT
Start: 2023-12-24 | End: 2023-12-24

## 2023-12-24 RX ADMIN — SODIUM CHLORIDE 1000 ML: 9 INJECTION, SOLUTION INTRAVENOUS at 14:36

## 2023-12-24 RX ADMIN — ONDANSETRON 4 MG: 2 INJECTION INTRAMUSCULAR; INTRAVENOUS at 14:36

## 2023-12-24 RX ADMIN — FENTANYL CITRATE 25 MCG: 50 INJECTION INTRAMUSCULAR; INTRAVENOUS at 14:36

## 2023-12-24 RX ADMIN — KETOROLAC TROMETHAMINE 15 MG: 30 INJECTION, SOLUTION INTRAMUSCULAR; INTRAVENOUS at 14:36

## 2023-12-24 RX ADMIN — CAFFEINE AND SODIUM BENZOATE 500 MG: 125 INJECTION, SOLUTION INTRAMUSCULAR; INTRAVENOUS at 15:52

## 2023-12-24 NOTE — ED PROVIDER NOTES
tablet  Commonly known as: Naprosyn  Take 1 tablet by mouth 2 times daily     pravastatin 10 MG tablet  Commonly known as: PRAVACHOL     predniSONE 5 MG (21) Tbpk  Take as prescribed on package     terazosin 5 MG capsule  Commonly known as: HYTRIN               Where to Get Your Medications        These medications were sent to [address] [place] [address], [address] [place] [address],[address] [phone]  [address] [place] [address], [address] [place] [address] Drive      Phone: [phone]   butalbital-APAP-caffeine -40 MG Caps per capsule           DISCONTINUED MEDICATIONS:  Current Discharge Medication List          I am the Primary Clinician of Record. Ruba Guadalupe MD (electronically signed)    (Please note that parts of this dictation were completed with voice recognition software. Quite often unanticipated grammatical, syntax, homophones, and other interpretive errors are inadvertently transcribed by the computer software. Please disregards these errors.  Please excuse any errors that have escaped final proofreading.)        Ruba Guadalupe MD  12/25/23 [id number]

## 2024-01-05 ENCOUNTER — HOSPITAL ENCOUNTER (OUTPATIENT)
Facility: HOSPITAL | Age: 79
Discharge: HOME | End: 2024-01-08

## 2024-01-05 VITALS
WEIGHT: 142.2 LBS | HEART RATE: 55 BPM | OXYGEN SATURATION: 98 % | RESPIRATION RATE: 16 BRPM | DIASTOLIC BLOOD PRESSURE: 67 MMHG | HEIGHT: 66 IN | TEMPERATURE: 97.9 F | SYSTOLIC BLOOD PRESSURE: 147 MMHG | BODY MASS INDEX: 22.85 KG/M2

## 2024-01-05 ASSESSMENT — PAIN DESCRIPTION - LOCATION
LOCATION: HEAD

## 2024-01-05 ASSESSMENT — PAIN SCALES - GENERAL
PAINLEVEL_OUTOF10: 2
PAINLEVEL_OUTOF10: 4
PAINLEVEL_OUTOF10: 1
PAINLEVEL_OUTOF10: 2

## 2024-01-05 ASSESSMENT — PAIN DESCRIPTION - ORIENTATION: ORIENTATION: POSTERIOR

## 2024-01-05 ASSESSMENT — PAIN DESCRIPTION - DESCRIPTORS: DESCRIPTORS: ACHING

## 2024-01-05 NOTE — PERIOP NOTE
0914:  Dr. Brewster in to see the patient & discuss procedure    0937:  Time out completed for Epidural blood patch    0946:  Blood patch completed.  Patient assisted to supine position    1000:  Patient resting comfortably in bed rates headache 2/10    1035:  VSS.  Patient rates headache 1/10 now.  HOB elevated.  Sipping on a pepsi.    1054:  Discharge instructions reviewed with the patient.  He verbalizes understanding of all info & has copy to take home.  He was assisted OOB to BR to void & change into clothes for d/c to home.    1100:  Discharge instructions reviewed with the patient's daughter as well.  She verbalizes understanding of all info provided.     1103:  IV removed & patient d/c'd from hospital with all belongings.

## 2024-01-05 NOTE — DISCHARGE INSTRUCTIONS
Patient may be discharged to home.       Blood Patch Procedure: Care Instructions  Overview     A blood patch procedure uses your own blood to help your headache.  Headaches may happen after certain procedures that involve the spine, such as a myelogram or an epidural for anesthesia. In these procedures, the needle that is used sometimes causes a bit of spinal fluid to leak out of the space around your spinal cord. The leak usually isn't dangerous. But if enough fluid leaks out, it changes the pressure around your spinal cord. The pressure around your spinal cord can also change if fluid is removed for testing during a spinal tap (lumbar puncture). This change in pressure can cause a very bad headache.  To apply a blood patch, first your doctor takes blood from your arm. Then the blood is injected into the area of your lower back where the leak happened. The blood restores the pressure around your spinal cord. It also helps seal any leak that may still be there.  Many people feel better right away, but it could take a day or two. And a few people need to have a second blood patch.  Follow-up care is a key part of your treatment and safety. Be sure to make and go to all appointments, and call your doctor if you are having problems. It's also a good idea to know your test results and keep a list of the medicines you take.  How can you care for yourself at home?  For the first 24 hours, return to your regular activities slowly. Avoid strenuous activity.  If the injection site is sore, put ice or a cold pack on the area for 10 to 20 minutes at a time. Put a thin cloth between the ice and your skin.  When should you call for help?   Call your doctor now or seek immediate medical care if:    You have a fever.     You have a new or worse headache.     You have a stiff neck.     You have drainage or bleeding from the puncture site.     You have tingling, weakness, or numbness in your legs.     You have trouble urinating or

## 2024-01-05 NOTE — PROCEDURES
Epidural Blood Patch Procedure Note      Patient is a 77 y/o WF sp CT myelogram in December 2023.  Patient has subsequently developed a positional headache relieved with assuming the recumbent position.  No fever or focal neurological deficits.  Patient wishes to pursue aggressive treatment with epidural blood patch after failing conservative therapy.  Risk and benefits were discussed with the patient and plans are to proceed.     Patient was placed in the seated position.     The back was prepped at the lumbar region and right AC with betadine.     2% lidocaine was used as local at L4-L5.     A 17 Tuohy needle was passed x 1 attempt(s) at the above stated level.     Loss of resistance was obtained using air.     20 mL of sterile blood was withdrawn and placed in the epidural space until 100% resolution of headache and neck stiffness.    Patient was instructed to followup emergently in the nearest ER if he experiences signs and symptoms of focal neurological deficits ie. bowel / bladder / motor loss or localized tenderness / erythema at the epidural site associated with fever.    Patient also instructed not do any heavy lifting for the next 36-48 hours.

## 2024-02-13 ENCOUNTER — HOSPITAL ENCOUNTER (OUTPATIENT)
Facility: HOSPITAL | Age: 79
Setting detail: OUTPATIENT SURGERY
Discharge: HOME | End: 2024-02-13
Attending: INTERNAL MEDICINE | Admitting: INTERNAL MEDICINE
Payer: MEDICARE

## 2024-02-13 ENCOUNTER — ANESTHESIA (OUTPATIENT)
Facility: HOSPITAL | Age: 79
End: 2024-02-13
Payer: MEDICARE

## 2024-02-13 ENCOUNTER — ANESTHESIA EVENT (OUTPATIENT)
Facility: HOSPITAL | Age: 79
End: 2024-02-13
Payer: MEDICARE

## 2024-02-13 VITALS
BODY MASS INDEX: 24.11 KG/M2 | HEIGHT: 66 IN | HEART RATE: 55 BPM | WEIGHT: 150 LBS | TEMPERATURE: 98.4 F | SYSTOLIC BLOOD PRESSURE: 95 MMHG | OXYGEN SATURATION: 96 % | RESPIRATION RATE: 20 BRPM | DIASTOLIC BLOOD PRESSURE: 57 MMHG

## 2024-02-13 PROCEDURE — 6360000002 HC RX W HCPCS: Performed by: ANESTHESIOLOGY

## 2024-02-13 PROCEDURE — 3700000000 HC ANESTHESIA ATTENDED CARE: Performed by: INTERNAL MEDICINE

## 2024-02-13 PROCEDURE — 88305 TISSUE EXAM BY PATHOLOGIST: CPT

## 2024-02-13 PROCEDURE — 2709999900 HC NON-CHARGEABLE SUPPLY: Performed by: INTERNAL MEDICINE

## 2024-02-13 PROCEDURE — 2580000003 HC RX 258: Performed by: INTERNAL MEDICINE

## 2024-02-13 PROCEDURE — 2500000003 HC RX 250 WO HCPCS: Performed by: ANESTHESIOLOGY

## 2024-02-13 PROCEDURE — 3600007502: Performed by: INTERNAL MEDICINE

## 2024-02-13 PROCEDURE — 3600007512: Performed by: INTERNAL MEDICINE

## 2024-02-13 PROCEDURE — 7100000011 HC PHASE II RECOVERY - ADDTL 15 MIN: Performed by: INTERNAL MEDICINE

## 2024-02-13 PROCEDURE — 7100000010 HC PHASE II RECOVERY - FIRST 15 MIN: Performed by: INTERNAL MEDICINE

## 2024-02-13 PROCEDURE — 3700000001 HC ADD 15 MINUTES (ANESTHESIA): Performed by: INTERNAL MEDICINE

## 2024-02-13 RX ORDER — LIDOCAINE HYDROCHLORIDE 20 MG/ML
INJECTION, SOLUTION EPIDURAL; INFILTRATION; INTRACAUDAL; PERINEURAL PRN
Status: DISCONTINUED | OUTPATIENT
Start: 2024-02-13 | End: 2024-02-13 | Stop reason: SDUPTHER

## 2024-02-13 RX ORDER — ASPIRIN 81 MG/1
81 TABLET ORAL DAILY
COMMUNITY

## 2024-02-13 RX ORDER — SODIUM CHLORIDE 9 MG/ML
INJECTION, SOLUTION INTRAVENOUS CONTINUOUS
Status: DISCONTINUED | OUTPATIENT
Start: 2024-02-13 | End: 2024-02-13 | Stop reason: HOSPADM

## 2024-02-13 RX ORDER — SODIUM CHLORIDE 0.9 % (FLUSH) 0.9 %
5-40 SYRINGE (ML) INJECTION PRN
Status: DISCONTINUED | OUTPATIENT
Start: 2024-02-13 | End: 2024-02-13 | Stop reason: HOSPADM

## 2024-02-13 RX ORDER — SODIUM CHLORIDE 9 MG/ML
25 INJECTION, SOLUTION INTRAVENOUS PRN
Status: DISCONTINUED | OUTPATIENT
Start: 2024-02-13 | End: 2024-02-13 | Stop reason: HOSPADM

## 2024-02-13 RX ORDER — SODIUM CHLORIDE 0.9 % (FLUSH) 0.9 %
5-40 SYRINGE (ML) INJECTION EVERY 12 HOURS SCHEDULED
Status: DISCONTINUED | OUTPATIENT
Start: 2024-02-13 | End: 2024-02-13 | Stop reason: HOSPADM

## 2024-02-13 RX ADMIN — PROPOFOL 30 MG: 10 INJECTION, EMULSION INTRAVENOUS at 10:18

## 2024-02-13 RX ADMIN — PROPOFOL 20 MG: 10 INJECTION, EMULSION INTRAVENOUS at 10:11

## 2024-02-13 RX ADMIN — LIDOCAINE HYDROCHLORIDE 60 MG: 20 INJECTION, SOLUTION EPIDURAL; INFILTRATION; INTRACAUDAL; PERINEURAL at 10:07

## 2024-02-13 RX ADMIN — PROPOFOL 30 MG: 10 INJECTION, EMULSION INTRAVENOUS at 10:23

## 2024-02-13 RX ADMIN — PROPOFOL 30 MG: 10 INJECTION, EMULSION INTRAVENOUS at 10:26

## 2024-02-13 RX ADMIN — PROPOFOL 30 MG: 10 INJECTION, EMULSION INTRAVENOUS at 10:28

## 2024-02-13 RX ADMIN — PROPOFOL 40 MG: 10 INJECTION, EMULSION INTRAVENOUS at 10:10

## 2024-02-13 RX ADMIN — PROPOFOL 60 MG: 10 INJECTION, EMULSION INTRAVENOUS at 10:07

## 2024-02-13 RX ADMIN — PROPOFOL 20 MG: 10 INJECTION, EMULSION INTRAVENOUS at 10:15

## 2024-02-13 RX ADMIN — PROPOFOL 30 MG: 10 INJECTION, EMULSION INTRAVENOUS at 10:20

## 2024-02-13 RX ADMIN — SODIUM CHLORIDE: 9 INJECTION, SOLUTION INTRAVENOUS at 09:59

## 2024-02-13 RX ADMIN — PROPOFOL 20 MG: 10 INJECTION, EMULSION INTRAVENOUS at 10:13
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
## 2024-02-13 NOTE — INTERVAL H&P NOTE
naproxen (NAPROSYN) 500 MG tablet   No No   Sig: Take 1 tablet by mouth 2 times daily   pravastatin (PRAVACHOL) 10 MG tablet   Yes No   Sig: Take by mouth   predniSONE 5 MG (21) TBPK   No No   Sig: Take as prescribed on package   terazosin (HYTRIN) 5 MG capsule   Yes No   Sig: Take by mouth      Facility-Administered Medications: None       PHYSICAL EXAM:  The patient is examined immediately prior to the procedure.  There were no vitals filed for this visit.  Gen: Appears comfortable, no distress.  Pulm: comfortable respirations with no abnormal audible breath sounds  HEART: well perfused, no abnormal audible heart sounds  GI: abdomen flat.    PLAN:  Informed consent discussion held, patient afforded an opportunity to ask questions and all questions answered.  After being advised of the risks, benefits, and alternatives, the patient requested that we proceed and indicated so on a written consent form.      Will proceed with procedure as planned.  Quirino ySed Jr, MD

## 2024-02-13 NOTE — H&P
78 y.o. male presents for open access colonoscopy for surveillance given a personal history of colon polyps.  Additional H&P data will be attached on the day of procedure.    Quirino Syed Jr, MD

## 2024-02-13 NOTE — OP NOTE
[facility]  [facility]  MIRTHA Syed Jr, MD  [phone]      2024    Colonoscopy Procedure Note  Rodriguez Vergara  :  1945  [facility] Medical Record Number: [id number]    Indications:   personal history of colon polyps  PCP:  Lidia Walton MD  Anesthesia/Sedation: See Anesthesia Record  Endoscopist:  Dr. MIRTHA Syed Jr  Complications:  None  Estimated Blood Loss:  None    Surgical assistant: Circulator: Jorge L Dalton RN  Circulator Assist: Rachel Ortiz RN     Implants none unless otherwise specified.     Permit:  The indications, risks, benefits and alternatives were reviewed with the patient or their decision maker who was provided an opportunity to ask questions and all questions were answered.  The specific risks of colonoscopy with conscious sedation were reviewed, including but not limited to anesthetic complication, bleeding, adverse drug reaction, missed lesion, infection, IV site reactions, and intestinal perforation which would lead to the need for surgical repair.  Alternatives to colonoscopy including radiographic imaging, observation without testing, or laboratory testing were reviewed including the limitations of those alternatives.  After considering the options and having all their questions answered, the patient or their decision maker provided both verbal and written consent to proceed.        Procedure in Detail:  After obtaining informed consent, positioning of the patient in the left lateral decubitus position, and conduction of a pre-procedure pause or \"time out\" the endoscope was introduced into the anus and advanced to the cecum, which was identified by the ileocecal valve and appendiceal orifice.  The quality of the colonic preparation was fair.  A careful inspection was made as the colonoscope was withdrawn, findings and interventions are described

## 2024-02-13 NOTE — DISCHARGE INSTRUCTIONS
[facility]  [facility]  MIRTHA Taylor Jr, MD  [phone]      February 13, 2024    Rodriguez Vergara  YOB: 1945    COLONOSCOPY DISCHARGE INSTRUCTIONS    If there is redness at IV site you should apply warm compress to area.  If redness or soreness persist contact Dr. Taylor's office or your primary care doctor.    There may be a slight amount of blood passed from the rectum.  Gaseous discomfort may develop, but walking, belching will help relieve this.  You may not operate a vehicle for 12 hours  You may not operate machinery or dangerous appliances for rest of today  You may not drink alcoholic beverages for 12 hours  Avoid making any critical decisions for 24 hours    DIET:  You may resume your normal diet, but some patients find that heavy or large meals may lead to indigestion or vomiting.  I suggest a light meal as first food intake.    MEDICATIONS:  The use of some over-the-counter pain medication may lead to bleeding after colon biopsies or polyp removal.  Tylenol (also called acetaminophen) is safe to take even if you have had colonoscopy with polyp removal.  Based on the procedure you had today you may safely take aspirin or aspirin-like products for the next seven (7) days.  Remember that Tylenol (also called acetaminophen) is safe to take after colonoscopy even if you have had biopsies or polyps removed.    ACTIVITY:  You may resume your normal household activities, but it is recommended that you spend the remainder of the day resting -  avoid any strenuous activity.    CALL DR. TAYLOR'S OFFICE IF:  Increasing pain, nausea, vomiting  Abdominal distension (swelling)  Significant new or increased bleeding (oral or rectal)  Fever/Chills  Chest pain/shortness of breath                       Additional instructions:   Impression:  Descending colon polyps  Diverticulosis  Internal

## 2024-02-13 NOTE — ANESTHESIA POSTPROCEDURE EVALUATION
Department of Anesthesiology  Postprocedure Note    Patient: Rodriguez Vergara  MRN: [id number]  YOB: 1945  Date of evaluation: 2/13/2024    Procedure Summary       Date: 02/13/24 Room / Location: [facility] ENDO 03 / [facility] ENDOSCOPY    Anesthesia Start: 1001 Anesthesia Stop: 1030    Procedure: COLONOSCOPY (Lower GI Region) Diagnosis:       Personal history of colonic polyps      (Personal history of colonic polyps [Z86.010])    Surgeons: Quirino Syed MD Responsible Provider: Felton Nelson Jr., MD    Anesthesia Type: MAC ASA Status: 3            Anesthesia Type: MAC    Sunita Phase I: Sunita Score: 10    Sunita Phase II: Sunita Score: 8    Anesthesia Post Evaluation    Patient location during evaluation: PACU  Patient participation: complete - patient participated  Level of consciousness: awake  Airway patency: patent  Nausea & Vomiting: no nausea  Cardiovascular status: blood pressure returned to baseline and hemodynamically stable  Respiratory status: acceptable  Hydration status: stable    No notable events documented.

## 2024-03-12 ENCOUNTER — TELEPHONE (OUTPATIENT)
Age: 79
End: 2024-03-12

## 2024-03-12 NOTE — TELEPHONE ENCOUNTER
Received a referral to schedule a New Patient Appointment. Reached out to the patient to schedule and the patient asked for something closer to him. Faxed referral to [facility].

## 2024-03-18 ENCOUNTER — HOSPITAL ENCOUNTER (EMERGENCY)
Facility: HOSPITAL | Age: 79
Discharge: HOME | End: 2024-03-19
Attending: EMERGENCY MEDICINE
Payer: MEDICARE

## 2024-03-18 ENCOUNTER — APPOINTMENT (OUTPATIENT)
Facility: HOSPITAL | Age: 79
End: 2024-03-18
Payer: MEDICARE

## 2024-03-18 DIAGNOSIS — J44.1 COPD EXACERBATION (HCC): Primary | ICD-10-CM

## 2024-03-18 LAB
GLUCOSE BLD STRIP.AUTO-MCNC: 127 MG/DL (ref 65–117)
LACTATE BLD-SCNC: 1.49 MMOL/L (ref 0.4–2)
SERVICE CMNT-IMP: ABNORMAL
TROPONIN I SERPL HS-MCNC: 16 NG/L (ref 0–76)

## 2024-03-18 PROCEDURE — 2580000003 HC RX 258: Performed by: EMERGENCY MEDICINE

## 2024-03-18 PROCEDURE — 83605 ASSAY OF LACTIC ACID: CPT

## 2024-03-18 PROCEDURE — 82962 GLUCOSE BLOOD TEST: CPT

## 2024-03-18 PROCEDURE — 6360000002 HC RX W HCPCS: Performed by: EMERGENCY MEDICINE

## 2024-03-18 PROCEDURE — 96365 THER/PROPH/DIAG IV INF INIT: CPT

## 2024-03-18 PROCEDURE — 87040 BLOOD CULTURE FOR BACTERIA: CPT

## 2024-03-18 PROCEDURE — 36415 COLL VENOUS BLD VENIPUNCTURE: CPT

## 2024-03-18 PROCEDURE — 84484 ASSAY OF TROPONIN QUANT: CPT

## 2024-03-18 PROCEDURE — 99285 EMERGENCY DEPT VISIT HI MDM: CPT

## 2024-03-18 PROCEDURE — 83880 ASSAY OF NATRIURETIC PEPTIDE: CPT

## 2024-03-18 PROCEDURE — 93005 ELECTROCARDIOGRAM TRACING: CPT | Performed by: EMERGENCY MEDICINE

## 2024-03-18 PROCEDURE — 71045 X-RAY EXAM CHEST 1 VIEW: CPT

## 2024-03-18 PROCEDURE — 96375 TX/PRO/DX INJ NEW DRUG ADDON: CPT

## 2024-03-18 PROCEDURE — 6370000000 HC RX 637 (ALT 250 FOR IP): Performed by: EMERGENCY MEDICINE

## 2024-03-18 PROCEDURE — 85025 COMPLETE CBC W/AUTO DIFF WBC: CPT

## 2024-03-18 PROCEDURE — 80053 COMPREHEN METABOLIC PANEL: CPT

## 2024-03-18 PROCEDURE — 84145 PROCALCITONIN (PCT): CPT

## 2024-03-18 RX ORDER — IPRATROPIUM BROMIDE AND ALBUTEROL SULFATE 2.5; .5 MG/3ML; MG/3ML
1 SOLUTION RESPIRATORY (INHALATION)
Status: COMPLETED | OUTPATIENT
Start: 2024-03-18 | End: 2024-03-18

## 2024-03-18 RX ORDER — 0.9 % SODIUM CHLORIDE 0.9 %
500 INTRAVENOUS SOLUTION INTRAVENOUS ONCE
Status: COMPLETED | OUTPATIENT
Start: 2024-03-18 | End: 2024-03-19

## 2024-03-18 RX ORDER — METHYLPREDNISOLONE SODIUM SUCCINATE 125 MG/2ML
125 INJECTION, POWDER, LYOPHILIZED, FOR SOLUTION INTRAMUSCULAR; INTRAVENOUS ONCE
Status: COMPLETED | OUTPATIENT
Start: 2024-03-18 | End: 2024-03-18

## 2024-03-18 RX ADMIN — IPRATROPIUM BROMIDE AND ALBUTEROL SULFATE 1 DOSE: .5; 3 SOLUTION RESPIRATORY (INHALATION) at 22:44

## 2024-03-18 RX ADMIN — SODIUM CHLORIDE 1000 MG: 900 INJECTION INTRAVENOUS at 23:42

## 2024-03-18 RX ADMIN — METHYLPREDNISOLONE SODIUM SUCCINATE 125 MG: 125 INJECTION INTRAMUSCULAR; INTRAVENOUS at 23:35

## 2024-03-18 RX ADMIN — SODIUM CHLORIDE 500 ML: 9 INJECTION, SOLUTION INTRAVENOUS at 23:38

## 2024-03-18 ASSESSMENT — PAIN DESCRIPTION - LOCATION
LOCATION: HEAD
LOCATION: CHEST

## 2024-03-18 ASSESSMENT — LIFESTYLE VARIABLES
HOW OFTEN DO YOU HAVE A DRINK CONTAINING ALCOHOL: NEVER
HOW MANY STANDARD DRINKS CONTAINING ALCOHOL DO YOU HAVE ON A TYPICAL DAY: PATIENT DOES NOT DRINK

## 2024-03-18 ASSESSMENT — PAIN - FUNCTIONAL ASSESSMENT
PAIN_FUNCTIONAL_ASSESSMENT: 0-10
PAIN_FUNCTIONAL_ASSESSMENT: NONE - DENIES PAIN
PAIN_FUNCTIONAL_ASSESSMENT: 0-10

## 2024-03-18 ASSESSMENT — PAIN DESCRIPTION - FREQUENCY: FREQUENCY: INTERMITTENT

## 2024-03-18 ASSESSMENT — PAIN SCALES - GENERAL: PAINLEVEL_OUTOF10: 5

## 2024-03-18 ASSESSMENT — PAIN DESCRIPTION - ORIENTATION
ORIENTATION: ANTERIOR;UPPER
ORIENTATION: ANTERIOR

## 2024-03-18 ASSESSMENT — PAIN DESCRIPTION - PAIN TYPE
TYPE: ACUTE PAIN
TYPE: ACUTE PAIN

## 2024-03-18 ASSESSMENT — PAIN DESCRIPTION - DESCRIPTORS
DESCRIPTORS: SQUEEZING
DESCRIPTORS: DULL

## 2024-03-18 ASSESSMENT — PAIN DESCRIPTION - ONSET: ONSET: GRADUAL

## 2024-03-19 VITALS
HEART RATE: 58 BPM | OXYGEN SATURATION: 91 % | RESPIRATION RATE: 19 BRPM | HEIGHT: 66 IN | WEIGHT: 144.84 LBS | DIASTOLIC BLOOD PRESSURE: 58 MMHG | TEMPERATURE: 97.6 F | SYSTOLIC BLOOD PRESSURE: 110 MMHG | BODY MASS INDEX: 23.28 KG/M2

## 2024-03-19 LAB
ALBUMIN SERPL-MCNC: 3 G/DL (ref 3.5–5)
ALBUMIN/GLOB SERPL: 0.6 (ref 1.1–2.2)
ALP SERPL-CCNC: 70 U/L (ref 45–117)
ALT SERPL-CCNC: 20 U/L (ref 12–78)
ANION GAP SERPL CALC-SCNC: 3 MMOL/L (ref 5–15)
AST SERPL-CCNC: 33 U/L (ref 15–37)
BASOPHILS # BLD: 0.1 K/UL (ref 0–0.1)
BASOPHILS NFR BLD: 1 % (ref 0–1)
BILIRUB SERPL-MCNC: 0.9 MG/DL (ref 0.2–1)
BUN SERPL-MCNC: 36 MG/DL (ref 6–20)
BUN/CREAT SERPL: 28 (ref 12–20)
CALCIUM SERPL-MCNC: 8.6 MG/DL (ref 8.5–10.1)
CHLORIDE SERPL-SCNC: 109 MMOL/L (ref 97–108)
CO2 SERPL-SCNC: 24 MMOL/L (ref 21–32)
CREAT SERPL-MCNC: 1.27 MG/DL (ref 0.7–1.3)
DIFFERENTIAL METHOD BLD: ABNORMAL
EKG ATRIAL RATE: 59 BPM
EKG DIAGNOSIS: NORMAL
EKG P AXIS: 43 DEGREES
EKG P-R INTERVAL: 190 MS
EKG Q-T INTERVAL: 436 MS
EKG QRS DURATION: 102 MS
EKG QTC CALCULATION (BAZETT): 431 MS
EKG R AXIS: 46 DEGREES
EKG T AXIS: 66 DEGREES
EKG VENTRICULAR RATE: 59 BPM
EOSINOPHIL # BLD: 0.4 K/UL (ref 0–0.4)
EOSINOPHIL NFR BLD: 6 % (ref 0–7)
ERYTHROCYTE [DISTWIDTH] IN BLOOD BY AUTOMATED COUNT: 15.4 % (ref 11.5–14.5)
GLOBULIN SER CALC-MCNC: 4.8 G/DL (ref 2–4)
GLUCOSE SERPL-MCNC: 117 MG/DL (ref 65–100)
HCT VFR BLD AUTO: 33.5 % (ref 36.6–50.3)
HGB BLD-MCNC: 10.5 G/DL (ref 12.1–17)
IMM GRANULOCYTES # BLD AUTO: 0 K/UL (ref 0–0.04)
IMM GRANULOCYTES NFR BLD AUTO: 0 % (ref 0–0.5)
LYMPHOCYTES # BLD: 2.8 K/UL (ref 0.8–3.5)
LYMPHOCYTES NFR BLD: 43 % (ref 12–49)
MCH RBC QN AUTO: 30 PG (ref 26–34)
MCHC RBC AUTO-ENTMCNC: 31.3 G/DL (ref 30–36.5)
MCV RBC AUTO: 95.7 FL (ref 80–99)
MONOCYTES # BLD: 0.4 K/UL (ref 0–1)
MONOCYTES NFR BLD: 7 % (ref 5–13)
NEUTS SEG # BLD: 2.7 K/UL (ref 1.8–8)
NEUTS SEG NFR BLD: 43 % (ref 32–75)
NRBC # BLD: 0 K/UL (ref 0–0.01)
NRBC BLD-RTO: 0 PER 100 WBC
NT PRO BNP: 644 PG/ML
PLATELET # BLD AUTO: 91 K/UL (ref 150–400)
POTASSIUM SERPL-SCNC: 3.6 MMOL/L (ref 3.5–5.1)
PROCALCITONIN SERPL-MCNC: <0.05 NG/ML
PROT SERPL-MCNC: 7.8 G/DL (ref 6.4–8.2)
RBC # BLD AUTO: 3.5 M/UL (ref 4.1–5.7)
RBC MORPH BLD: ABNORMAL
RBC MORPH BLD: ABNORMAL
SODIUM SERPL-SCNC: 136 MMOL/L (ref 136–145)
WBC # BLD AUTO: 6.4 K/UL (ref 4.1–11.1)

## 2024-03-19 PROCEDURE — 6370000000 HC RX 637 (ALT 250 FOR IP): Performed by: EMERGENCY MEDICINE

## 2024-03-19 RX ORDER — IPRATROPIUM BROMIDE AND ALBUTEROL SULFATE 2.5; .5 MG/3ML; MG/3ML
1 SOLUTION RESPIRATORY (INHALATION)
Status: COMPLETED | OUTPATIENT
Start: 2024-03-19 | End: 2024-03-19

## 2024-03-19 RX ORDER — PREDNISONE 10 MG/1
TABLET ORAL
Qty: 42 TABLET | Refills: 0 | Status: SHIPPED | OUTPATIENT
Start: 2024-03-19 | End: 2024-03-30

## 2024-03-19 RX ORDER — DOXYCYCLINE HYCLATE 100 MG
100 TABLET ORAL 2 TIMES DAILY
Qty: 14 TABLET | Refills: 0 | Status: SHIPPED | OUTPATIENT
Start: 2024-03-19 | End: 2024-03-26

## 2024-03-19 RX ORDER — IPRATROPIUM BROMIDE AND ALBUTEROL SULFATE 2.5; .5 MG/3ML; MG/3ML
1 SOLUTION RESPIRATORY (INHALATION) EVERY 4 HOURS PRN
Qty: 30 EACH | Refills: 0 | Status: SHIPPED | OUTPATIENT
Start: 2024-03-19

## 2024-03-19 RX ADMIN — IPRATROPIUM BROMIDE AND ALBUTEROL SULFATE 1 DOSE: .5; 3 SOLUTION RESPIRATORY (INHALATION) at 03:04

## 2024-03-19 RX ADMIN — IPRATROPIUM BROMIDE AND ALBUTEROL SULFATE 1 DOSE: .5; 3 SOLUTION RESPIRATORY (INHALATION) at 02:03
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
## 2024-03-19 NOTE — ED NOTES
MD wanted manual verification of blood pressure. Blood pressure was taken manually and was 82/56. Automatic cuff reading was 84/72. MD notified.

## 2024-03-19 NOTE — ED NOTES
Patient discharged from the ED by MD Dario. Diagnosis, medications, precautions and follow-ups were reviewed with the patient/family. Questions were asked and answered prior to departure. Patient departed the ED via wheelchair and was accompanied by daughter to car.

## 2024-03-24 LAB
BACTERIA SPEC CULT: NORMAL
BACTERIA SPEC CULT: NORMAL
SERVICE CMNT-IMP: NORMAL
SERVICE CMNT-IMP: NORMAL

## 2024-04-17 ENCOUNTER — HOSPITAL ENCOUNTER (INPATIENT)
Facility: HOSPITAL | Age: 79
LOS: 4 days | Discharge: HOME | DRG: 192 | End: 2024-04-21
Attending: EMERGENCY MEDICINE | Admitting: STUDENT IN AN ORGANIZED HEALTH CARE EDUCATION/TRAINING PROGRAM
Payer: MEDICARE

## 2024-04-17 ENCOUNTER — APPOINTMENT (OUTPATIENT)
Facility: HOSPITAL | Age: 79
DRG: 192 | End: 2024-04-17
Payer: MEDICARE

## 2024-04-17 DIAGNOSIS — J40 BRONCHITIS: ICD-10-CM

## 2024-04-17 DIAGNOSIS — J96.01 ACUTE HYPOXEMIC RESPIRATORY FAILURE (HCC): Primary | ICD-10-CM

## 2024-04-17 DIAGNOSIS — J44.1 ACUTE EXACERBATION OF CHRONIC OBSTRUCTIVE PULMONARY DISEASE (COPD) (HCC): ICD-10-CM

## 2024-04-17 DIAGNOSIS — J18.9 ATYPICAL PNEUMONIA: ICD-10-CM

## 2024-04-17 LAB
ALBUMIN SERPL-MCNC: 2.9 G/DL (ref 3.5–5)
ALBUMIN/GLOB SERPL: 0.7 (ref 1.1–2.2)
ALP SERPL-CCNC: 67 U/L (ref 45–117)
ALT SERPL-CCNC: 18 U/L (ref 12–78)
ANION GAP SERPL CALC-SCNC: 5 MMOL/L (ref 5–15)
AST SERPL-CCNC: 20 U/L (ref 15–37)
BASOPHILS # BLD: 0 K/UL (ref 0–0.1)
BASOPHILS NFR BLD: 0 % (ref 0–1)
BILIRUB SERPL-MCNC: 0.7 MG/DL (ref 0.2–1)
BUN SERPL-MCNC: 27 MG/DL (ref 6–20)
BUN/CREAT SERPL: 22 (ref 12–20)
CALCIUM SERPL-MCNC: 8.6 MG/DL (ref 8.5–10.1)
CHLORIDE SERPL-SCNC: 108 MMOL/L (ref 97–108)
CO2 SERPL-SCNC: 25 MMOL/L (ref 21–32)
CREAT SERPL-MCNC: 1.22 MG/DL (ref 0.7–1.3)
DIFFERENTIAL METHOD BLD: ABNORMAL
EOSINOPHIL # BLD: 0.2 K/UL (ref 0–0.4)
EOSINOPHIL NFR BLD: 5 % (ref 0–7)
ERYTHROCYTE [DISTWIDTH] IN BLOOD BY AUTOMATED COUNT: 14.7 % (ref 11.5–14.5)
FLUAV AG NPH QL IA: NEGATIVE
FLUBV AG NOSE QL IA: NEGATIVE
GLOBULIN SER CALC-MCNC: 4.1 G/DL (ref 2–4)
GLUCOSE BLD STRIP.AUTO-MCNC: 144 MG/DL (ref 65–117)
GLUCOSE BLD STRIP.AUTO-MCNC: 195 MG/DL (ref 65–117)
GLUCOSE BLD STRIP.AUTO-MCNC: 216 MG/DL (ref 65–117)
GLUCOSE SERPL-MCNC: 166 MG/DL (ref 65–100)
HCT VFR BLD AUTO: 38.3 % (ref 36.6–50.3)
HGB BLD-MCNC: 11.8 G/DL (ref 12.1–17)
IMM GRANULOCYTES # BLD AUTO: 0 K/UL (ref 0–0.04)
IMM GRANULOCYTES NFR BLD AUTO: 0 % (ref 0–0.5)
LYMPHOCYTES # BLD: 2.6 K/UL (ref 0.8–3.5)
LYMPHOCYTES NFR BLD: 49 % (ref 12–49)
MAGNESIUM SERPL-MCNC: 2.3 MG/DL (ref 1.6–2.4)
MCH RBC QN AUTO: 30.4 PG (ref 26–34)
MCHC RBC AUTO-ENTMCNC: 30.8 G/DL (ref 30–36.5)
MCV RBC AUTO: 98.7 FL (ref 80–99)
MONOCYTES # BLD: 0.3 K/UL (ref 0–1)
MONOCYTES NFR BLD: 5 % (ref 5–13)
NEUTS SEG # BLD: 2.2 K/UL (ref 1.8–8)
NEUTS SEG NFR BLD: 41 % (ref 32–75)
NRBC # BLD: 0 K/UL (ref 0–0.01)
NRBC BLD-RTO: 0 PER 100 WBC
PLATELET # BLD AUTO: 118 K/UL (ref 150–400)
PMV BLD AUTO: 11 FL (ref 8.9–12.9)
POTASSIUM SERPL-SCNC: 3.9 MMOL/L (ref 3.5–5.1)
PROT SERPL-MCNC: 7 G/DL (ref 6.4–8.2)
RBC # BLD AUTO: 3.88 M/UL (ref 4.1–5.7)
SARS-COV-2 RDRP RESP QL NAA+PROBE: NOT DETECTED
SERVICE CMNT-IMP: ABNORMAL
SODIUM SERPL-SCNC: 138 MMOL/L (ref 136–145)
SOURCE: NORMAL
WBC # BLD AUTO: 5.3 K/UL (ref 4.1–11.1)

## 2024-04-17 PROCEDURE — 87635 SARS-COV-2 COVID-19 AMP PRB: CPT

## 2024-04-17 PROCEDURE — 80053 COMPREHEN METABOLIC PANEL: CPT

## 2024-04-17 PROCEDURE — 6360000002 HC RX W HCPCS: Performed by: STUDENT IN AN ORGANIZED HEALTH CARE EDUCATION/TRAINING PROGRAM

## 2024-04-17 PROCEDURE — 85025 COMPLETE CBC W/AUTO DIFF WBC: CPT

## 2024-04-17 PROCEDURE — 82962 GLUCOSE BLOOD TEST: CPT

## 2024-04-17 PROCEDURE — 94640 AIRWAY INHALATION TREATMENT: CPT

## 2024-04-17 PROCEDURE — 6370000000 HC RX 637 (ALT 250 FOR IP): Performed by: STUDENT IN AN ORGANIZED HEALTH CARE EDUCATION/TRAINING PROGRAM

## 2024-04-17 PROCEDURE — 1100000003 HC PRIVATE W/ TELEMETRY

## 2024-04-17 PROCEDURE — 6370000000 HC RX 637 (ALT 250 FOR IP): Performed by: EMERGENCY MEDICINE

## 2024-04-17 PROCEDURE — 6360000002 HC RX W HCPCS: Performed by: EMERGENCY MEDICINE

## 2024-04-17 PROCEDURE — 83735 ASSAY OF MAGNESIUM: CPT

## 2024-04-17 PROCEDURE — 71045 X-RAY EXAM CHEST 1 VIEW: CPT

## 2024-04-17 PROCEDURE — 99285 EMERGENCY DEPT VISIT HI MDM: CPT

## 2024-04-17 PROCEDURE — 2580000003 HC RX 258: Performed by: STUDENT IN AN ORGANIZED HEALTH CARE EDUCATION/TRAINING PROGRAM

## 2024-04-17 PROCEDURE — 36415 COLL VENOUS BLD VENIPUNCTURE: CPT

## 2024-04-17 PROCEDURE — 2580000003 HC RX 258: Performed by: EMERGENCY MEDICINE

## 2024-04-17 PROCEDURE — 87804 INFLUENZA ASSAY W/OPTIC: CPT

## 2024-04-17 PROCEDURE — 94762 N-INVAS EAR/PLS OXIMTRY CONT: CPT

## 2024-04-17 PROCEDURE — 96374 THER/PROPH/DIAG INJ IV PUSH: CPT

## 2024-04-17 RX ORDER — INSULIN LISPRO 100 [IU]/ML
0-4 INJECTION, SOLUTION INTRAVENOUS; SUBCUTANEOUS
Status: DISCONTINUED | OUTPATIENT
Start: 2024-04-17 | End: 2024-04-18

## 2024-04-17 RX ORDER — SODIUM CHLORIDE 0.9 % (FLUSH) 0.9 %
5-40 SYRINGE (ML) INJECTION EVERY 12 HOURS SCHEDULED
Status: DISCONTINUED | OUTPATIENT
Start: 2024-04-17 | End: 2024-04-21 | Stop reason: HOSPADM

## 2024-04-17 RX ORDER — ONDANSETRON 4 MG/1
4 TABLET, ORALLY DISINTEGRATING ORAL EVERY 8 HOURS PRN
Status: DISCONTINUED | OUTPATIENT
Start: 2024-04-17 | End: 2024-04-21 | Stop reason: HOSPADM

## 2024-04-17 RX ORDER — MAGNESIUM SULFATE 1 G/100ML
1000 INJECTION INTRAVENOUS ONCE
Status: COMPLETED | OUTPATIENT
Start: 2024-04-17 | End: 2024-04-17

## 2024-04-17 RX ORDER — DOXAZOSIN 2 MG/1
4 TABLET ORAL DAILY
Status: DISCONTINUED | OUTPATIENT
Start: 2024-04-17 | End: 2024-04-21 | Stop reason: HOSPADM

## 2024-04-17 RX ORDER — ACETAMINOPHEN 325 MG/1
650 TABLET ORAL EVERY 6 HOURS PRN
Status: DISCONTINUED | OUTPATIENT
Start: 2024-04-17 | End: 2024-04-21 | Stop reason: HOSPADM

## 2024-04-17 RX ORDER — ALBUTEROL SULFATE 2.5 MG/3ML
2.5 SOLUTION RESPIRATORY (INHALATION)
Status: COMPLETED | OUTPATIENT
Start: 2024-04-17 | End: 2024-04-17

## 2024-04-17 RX ORDER — ONDANSETRON 2 MG/ML
4 INJECTION INTRAMUSCULAR; INTRAVENOUS EVERY 4 HOURS PRN
Status: DISCONTINUED | OUTPATIENT
Start: 2024-04-17 | End: 2024-04-17

## 2024-04-17 RX ORDER — ASPIRIN 81 MG/1
81 TABLET ORAL DAILY
Status: DISCONTINUED | OUTPATIENT
Start: 2024-04-17 | End: 2024-04-21 | Stop reason: HOSPADM

## 2024-04-17 RX ORDER — IPRATROPIUM BROMIDE AND ALBUTEROL SULFATE 2.5; .5 MG/3ML; MG/3ML
3 SOLUTION RESPIRATORY (INHALATION)
Status: COMPLETED | OUTPATIENT
Start: 2024-04-17 | End: 2024-04-17

## 2024-04-17 RX ORDER — SODIUM CHLORIDE 9 MG/ML
INJECTION, SOLUTION INTRAVENOUS PRN
Status: DISCONTINUED | OUTPATIENT
Start: 2024-04-17 | End: 2024-04-21 | Stop reason: HOSPADM

## 2024-04-17 RX ORDER — ACETAMINOPHEN 325 MG/1
650 TABLET ORAL EVERY 4 HOURS PRN
Status: DISCONTINUED | OUTPATIENT
Start: 2024-04-17 | End: 2024-04-19 | Stop reason: SDUPTHER

## 2024-04-17 RX ORDER — IPRATROPIUM BROMIDE AND ALBUTEROL SULFATE 2.5; .5 MG/3ML; MG/3ML
1 SOLUTION RESPIRATORY (INHALATION)
Status: DISCONTINUED | OUTPATIENT
Start: 2024-04-17 | End: 2024-04-21 | Stop reason: HOSPADM

## 2024-04-17 RX ORDER — ACETAMINOPHEN 650 MG/1
650 SUPPOSITORY RECTAL EVERY 6 HOURS PRN
Status: DISCONTINUED | OUTPATIENT
Start: 2024-04-17 | End: 2024-04-21 | Stop reason: HOSPADM

## 2024-04-17 RX ORDER — INSULIN LISPRO 100 [IU]/ML
0-4 INJECTION, SOLUTION INTRAVENOUS; SUBCUTANEOUS NIGHTLY
Status: DISCONTINUED | OUTPATIENT
Start: 2024-04-17 | End: 2024-04-18

## 2024-04-17 RX ORDER — ENOXAPARIN SODIUM 100 MG/ML
40 INJECTION SUBCUTANEOUS DAILY
Status: DISCONTINUED | OUTPATIENT
Start: 2024-04-18 | End: 2024-04-21 | Stop reason: HOSPADM

## 2024-04-17 RX ORDER — GUAIFENESIN 600 MG/1
600 TABLET, EXTENDED RELEASE ORAL
Status: ACTIVE | OUTPATIENT
Start: 2024-04-17 | End: 2024-04-17

## 2024-04-17 RX ORDER — SODIUM CHLORIDE 0.9 % (FLUSH) 0.9 %
5-40 SYRINGE (ML) INJECTION PRN
Status: DISCONTINUED | OUTPATIENT
Start: 2024-04-17 | End: 2024-04-21 | Stop reason: HOSPADM

## 2024-04-17 RX ORDER — ONDANSETRON 2 MG/ML
4 INJECTION INTRAMUSCULAR; INTRAVENOUS EVERY 6 HOURS PRN
Status: DISCONTINUED | OUTPATIENT
Start: 2024-04-17 | End: 2024-04-21 | Stop reason: HOSPADM

## 2024-04-17 RX ORDER — SODIUM CHLORIDE 9 MG/ML
INJECTION, SOLUTION INTRAVENOUS ONCE
Status: COMPLETED | OUTPATIENT
Start: 2024-04-17 | End: 2024-04-17

## 2024-04-17 RX ORDER — PRAVASTATIN SODIUM 10 MG
10 TABLET ORAL NIGHTLY
Status: DISCONTINUED | OUTPATIENT
Start: 2024-04-17 | End: 2024-04-21 | Stop reason: HOSPADM

## 2024-04-17 RX ORDER — POLYETHYLENE GLYCOL 3350 17 G/17G
17 POWDER, FOR SOLUTION ORAL DAILY PRN
Status: DISCONTINUED | OUTPATIENT
Start: 2024-04-17 | End: 2024-04-21 | Stop reason: HOSPADM

## 2024-04-17 RX ORDER — CODEINE PHOSPHATE/GUAIFENESIN 10-100MG/5
5 LIQUID (ML) ORAL EVERY 4 HOURS PRN
Status: DISCONTINUED | OUTPATIENT
Start: 2024-04-17 | End: 2024-04-21 | Stop reason: HOSPADM

## 2024-04-17 RX ADMIN — AZITHROMYCIN MONOHYDRATE 500 MG: 500 INJECTION, POWDER, LYOPHILIZED, FOR SOLUTION INTRAVENOUS at 14:29

## 2024-04-17 RX ADMIN — DOXAZOSIN 4 MG: 2 TABLET ORAL at 16:03

## 2024-04-17 RX ADMIN — ASPIRIN 81 MG: 81 TABLET, COATED ORAL at 14:17

## 2024-04-17 RX ADMIN — METHYLPREDNISOLONE SODIUM SUCCINATE 125 MG: 125 INJECTION INTRAMUSCULAR; INTRAVENOUS at 05:53

## 2024-04-17 RX ADMIN — MAGNESIUM SULFATE HEPTAHYDRATE 1000 MG: 1 INJECTION, SOLUTION INTRAVENOUS at 09:09

## 2024-04-17 RX ADMIN — GUAIFENESIN AND CODEINE PHOSPHATE 5 ML: 100; 10 SOLUTION ORAL at 19:51

## 2024-04-17 RX ADMIN — SODIUM CHLORIDE: 9 INJECTION, SOLUTION INTRAVENOUS at 08:24

## 2024-04-17 RX ADMIN — WATER 60 MG: 1 INJECTION INTRAMUSCULAR; INTRAVENOUS; SUBCUTANEOUS at 21:49

## 2024-04-17 RX ADMIN — SODIUM CHLORIDE 1000 MG: 900 INJECTION INTRAVENOUS at 08:22

## 2024-04-17 RX ADMIN — IPRATROPIUM BROMIDE AND ALBUTEROL SULFATE 1 DOSE: .5; 3 SOLUTION RESPIRATORY (INHALATION) at 21:06

## 2024-04-17 RX ADMIN — PRAVASTATIN SODIUM 10 MG: 10 TABLET ORAL at 21:50

## 2024-04-17 RX ADMIN — ALBUTEROL SULFATE 2.5 MG: 2.5 SOLUTION RESPIRATORY (INHALATION) at 07:51

## 2024-04-17 RX ADMIN — IPRATROPIUM BROMIDE AND ALBUTEROL SULFATE 1 DOSE: .5; 3 SOLUTION RESPIRATORY (INHALATION) at 16:45

## 2024-04-17 RX ADMIN — WATER 60 MG: 1 INJECTION INTRAMUSCULAR; INTRAVENOUS; SUBCUTANEOUS at 14:14

## 2024-04-17 RX ADMIN — SODIUM CHLORIDE, PRESERVATIVE FREE 10 ML: 5 INJECTION INTRAVENOUS at 21:58

## 2024-04-17 RX ADMIN — IPRATROPIUM BROMIDE AND ALBUTEROL SULFATE 3 DOSE: .5; 3 SOLUTION RESPIRATORY (INHALATION) at 06:00

## 2024-04-17 ASSESSMENT — PAIN SCALES - GENERAL
PAINLEVEL_OUTOF10: 0
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
## 2024-04-17 NOTE — PROGRESS NOTES
Bedside shift report giving to Myrna , report included SBAR, recent findings and labs. Patient did not take doxazosin (he takes terazosin at home) Patient also takes the following:  metoprolol tartrate 25mg (1 tablet by mouth twice a day)   Irbesar/HCTZ 150-12.5mg  once daily. Md made aware and pharmacy notified. Thanks

## 2024-04-17 NOTE — ED NOTES
Report given to GHANSHYAM Chris. Nurse was informed of reason for arrival, vitals, labs, medications, orders, procedures, results, anything left pending and current plan of action. Questions were asked and received prior to departure from the patient.

## 2024-04-17 NOTE — H&P
Hospitalist Admission Note    NAME:   Rodriguez Vergara   : 1945   MRN: [id number]     Date/Time: 2024 12:03 PM    Patient PCP: Lidia Walton MD    ______________________________________________________________________  Given the patient's current clinical presentation, I have a high level of concern for decompensation if discharged from the emergency department.  Complex decision making was performed, which includes reviewing the patient's available past medical records, laboratory results, and x-ray films.       My assessment of this patient's clinical condition and my plan of care is as follows.    Assessment / Plan:    Acute COPD exacerbation:    CXR : stable finding, no pneumonia  Iv steroids  Duonebs scheduled, added dulera, will likely need prescription for this on discharge  Azithromycin course  Check rapid covid and flu, procalcitonin  Currently on 2 liters oxygen, wean as tolerated  Counselled on smoking cessation    Elevated blood glucose:  Likely d/t steroids, no hx of DM  Check hba1c  Sliding scale for now    CAD:  Continue baby aspirin    HTN:  Takes terazosin at home, switched to formulary substitute Cardura here    Hyperlipidemia:  Continue statin    Active smoker:  Counselled on cessation    Medical Decision Making:   I personally reviewed labs: CBC, BMP  I personally reviewed imaging:CXR  I personally reviewed EKG:  Toxic drug monitoring: iv steroids  Discussed case with: ED provider. After discussion I am in agreement that acuity of patient's medical condition necessitates hospital stay.      Code Status: Full  DVT Prophylaxis: Lovenox  Baseline: Independent from home    Subjective:   CHIEF COMPLAINT: worsening SOB    HISTORY OF PRESENT ILLNESS:     Rodriguez Vergara is a 78 y.o.  male with PMHx significant as below presented with worsening shortness of breath overnight. He reports having shortness of breath for last few weeks, was prescrbied steroids by his PMD 12 days back and

## 2024-04-17 NOTE — ED NOTES
0715: Assumed care of patient. Bedside shift change report given to GHANSHYAM Chris (oncoming nurse) by GHANSHYAM Eugene (offgoing nurse). Report included the following information Nurse Handoff Report. Patient in a position of comfort with call bell within reach and side rails x2.

## 2024-04-18 LAB
ANION GAP SERPL CALC-SCNC: 6 MMOL/L (ref 5–15)
BASOPHILS # BLD: 0 K/UL (ref 0–0.1)
BASOPHILS NFR BLD: 0 % (ref 0–1)
BUN SERPL-MCNC: 27 MG/DL (ref 6–20)
BUN/CREAT SERPL: 25 (ref 12–20)
CALCIUM SERPL-MCNC: 8.5 MG/DL (ref 8.5–10.1)
CHLORIDE SERPL-SCNC: 106 MMOL/L (ref 97–108)
CO2 SERPL-SCNC: 23 MMOL/L (ref 21–32)
CREAT SERPL-MCNC: 1.1 MG/DL (ref 0.7–1.3)
DIFFERENTIAL METHOD BLD: ABNORMAL
EOSINOPHIL # BLD: 0 K/UL (ref 0–0.4)
EOSINOPHIL NFR BLD: 0 % (ref 0–7)
ERYTHROCYTE [DISTWIDTH] IN BLOOD BY AUTOMATED COUNT: 14.3 % (ref 11.5–14.5)
EST. AVERAGE GLUCOSE BLD GHB EST-MCNC: 105 MG/DL
GLUCOSE BLD STRIP.AUTO-MCNC: 150 MG/DL (ref 65–117)
GLUCOSE SERPL-MCNC: 174 MG/DL (ref 65–100)
HBA1C MFR BLD: 5.3 % (ref 4–5.6)
HCT VFR BLD AUTO: 36.2 % (ref 36.6–50.3)
HGB BLD-MCNC: 11.5 G/DL (ref 12.1–17)
IMM GRANULOCYTES # BLD AUTO: 0.1 K/UL (ref 0–0.04)
IMM GRANULOCYTES NFR BLD AUTO: 1 % (ref 0–0.5)
LYMPHOCYTES # BLD: 0.7 K/UL (ref 0.8–3.5)
LYMPHOCYTES NFR BLD: 13 % (ref 12–49)
MCH RBC QN AUTO: 30.1 PG (ref 26–34)
MCHC RBC AUTO-ENTMCNC: 31.8 G/DL (ref 30–36.5)
MCV RBC AUTO: 94.8 FL (ref 80–99)
MONOCYTES # BLD: 0.1 K/UL (ref 0–1)
MONOCYTES NFR BLD: 1 % (ref 5–13)
NEUTS SEG # BLD: 4.3 K/UL (ref 1.8–8)
NEUTS SEG NFR BLD: 85 % (ref 32–75)
NRBC # BLD: 0 K/UL (ref 0–0.01)
NRBC BLD-RTO: 0 PER 100 WBC
PLATELET # BLD AUTO: 99 K/UL (ref 150–400)
PMV BLD AUTO: 9.3 FL (ref 8.9–12.9)
POTASSIUM SERPL-SCNC: 3.8 MMOL/L (ref 3.5–5.1)
RBC # BLD AUTO: 3.82 M/UL (ref 4.1–5.7)
RBC MORPH BLD: ABNORMAL
SERVICE CMNT-IMP: ABNORMAL
SODIUM SERPL-SCNC: 135 MMOL/L (ref 136–145)
WBC # BLD AUTO: 5.2 K/UL (ref 4.1–11.1)

## 2024-04-18 PROCEDURE — 6370000000 HC RX 637 (ALT 250 FOR IP): Performed by: GENERAL ACUTE CARE HOSPITAL

## 2024-04-18 PROCEDURE — 36415 COLL VENOUS BLD VENIPUNCTURE: CPT

## 2024-04-18 PROCEDURE — 6370000000 HC RX 637 (ALT 250 FOR IP): Performed by: STUDENT IN AN ORGANIZED HEALTH CARE EDUCATION/TRAINING PROGRAM

## 2024-04-18 PROCEDURE — 85025 COMPLETE CBC W/AUTO DIFF WBC: CPT

## 2024-04-18 PROCEDURE — 2700000000 HC OXYGEN THERAPY PER DAY

## 2024-04-18 PROCEDURE — 94640 AIRWAY INHALATION TREATMENT: CPT

## 2024-04-18 PROCEDURE — 83036 HEMOGLOBIN GLYCOSYLATED A1C: CPT

## 2024-04-18 PROCEDURE — 2580000003 HC RX 258: Performed by: STUDENT IN AN ORGANIZED HEALTH CARE EDUCATION/TRAINING PROGRAM

## 2024-04-18 PROCEDURE — 82962 GLUCOSE BLOOD TEST: CPT

## 2024-04-18 PROCEDURE — 6370000000 HC RX 637 (ALT 250 FOR IP): Performed by: EMERGENCY MEDICINE

## 2024-04-18 PROCEDURE — 1100000003 HC PRIVATE W/ TELEMETRY

## 2024-04-18 PROCEDURE — 6360000002 HC RX W HCPCS: Performed by: STUDENT IN AN ORGANIZED HEALTH CARE EDUCATION/TRAINING PROGRAM

## 2024-04-18 PROCEDURE — 80048 BASIC METABOLIC PNL TOTAL CA: CPT

## 2024-04-18 PROCEDURE — 94761 N-INVAS EAR/PLS OXIMETRY MLT: CPT

## 2024-04-18 RX ORDER — GUAIFENESIN 600 MG/1
600 TABLET, EXTENDED RELEASE ORAL 2 TIMES DAILY
Status: DISCONTINUED | OUTPATIENT
Start: 2024-04-18 | End: 2024-04-21 | Stop reason: HOSPADM

## 2024-04-18 RX ADMIN — METOPROLOL TARTRATE 25 MG: 25 TABLET, FILM COATED ORAL at 21:11

## 2024-04-18 RX ADMIN — PRAVASTATIN SODIUM 10 MG: 10 TABLET ORAL at 21:11

## 2024-04-18 RX ADMIN — WATER 60 MG: 1 INJECTION INTRAMUSCULAR; INTRAVENOUS; SUBCUTANEOUS at 04:21

## 2024-04-18 RX ADMIN — SODIUM CHLORIDE, PRESERVATIVE FREE 10 ML: 5 INJECTION INTRAVENOUS at 21:15

## 2024-04-18 RX ADMIN — WATER 60 MG: 1 INJECTION INTRAMUSCULAR; INTRAVENOUS; SUBCUTANEOUS at 21:11

## 2024-04-18 RX ADMIN — ASPIRIN 81 MG: 81 TABLET, COATED ORAL at 09:21

## 2024-04-18 RX ADMIN — WATER 60 MG: 1 INJECTION INTRAMUSCULAR; INTRAVENOUS; SUBCUTANEOUS at 15:09

## 2024-04-18 RX ADMIN — ENOXAPARIN SODIUM 40 MG: 100 INJECTION SUBCUTANEOUS at 09:21

## 2024-04-18 RX ADMIN — ACETAMINOPHEN 650 MG: 325 TABLET ORAL at 11:23

## 2024-04-18 RX ADMIN — SODIUM CHLORIDE, PRESERVATIVE FREE 10 ML: 5 INJECTION INTRAVENOUS at 09:21

## 2024-04-18 RX ADMIN — DOXAZOSIN 4 MG: 2 TABLET ORAL at 09:21

## 2024-04-18 RX ADMIN — SODIUM CHLORIDE: 9 INJECTION, SOLUTION INTRAVENOUS at 15:19

## 2024-04-18 RX ADMIN — GUAIFENESIN AND CODEINE PHOSPHATE 5 ML: 100; 10 SOLUTION ORAL at 15:08

## 2024-04-18 RX ADMIN — GUAIFENESIN AND CODEINE PHOSPHATE 5 ML: 100; 10 SOLUTION ORAL at 04:20

## 2024-04-18 RX ADMIN — GUAIFENESIN AND CODEINE PHOSPHATE 5 ML: 100; 10 SOLUTION ORAL at 09:00

## 2024-04-18 RX ADMIN — GUAIFENESIN 600 MG: 600 TABLET, EXTENDED RELEASE ORAL at 15:08

## 2024-04-18 RX ADMIN — GUAIFENESIN AND CODEINE PHOSPHATE 5 ML: 100; 10 SOLUTION ORAL at 22:25

## 2024-04-18 RX ADMIN — IPRATROPIUM BROMIDE AND ALBUTEROL SULFATE 1 DOSE: .5; 3 SOLUTION RESPIRATORY (INHALATION) at 15:29

## 2024-04-18 RX ADMIN — IPRATROPIUM BROMIDE AND ALBUTEROL SULFATE 1 DOSE: .5; 3 SOLUTION RESPIRATORY (INHALATION) at 12:02

## 2024-04-18 RX ADMIN — IPRATROPIUM BROMIDE AND ALBUTEROL SULFATE 1 DOSE: .5; 3 SOLUTION RESPIRATORY (INHALATION) at 21:23

## 2024-04-18 RX ADMIN — METOPROLOL TARTRATE 25 MG: 25 TABLET, FILM COATED ORAL at 15:08

## 2024-04-18 RX ADMIN — GUAIFENESIN 600 MG: 600 TABLET, EXTENDED RELEASE ORAL at 21:11

## 2024-04-18 RX ADMIN — IPRATROPIUM BROMIDE AND ALBUTEROL SULFATE 1 DOSE: .5; 3 SOLUTION RESPIRATORY (INHALATION) at 08:08

## 2024-04-18 RX ADMIN — AZITHROMYCIN MONOHYDRATE 500 MG: 500 INJECTION, POWDER, LYOPHILIZED, FOR SOLUTION INTRAVENOUS at 15:19

## 2024-04-18 NOTE — PROGRESS NOTES
End of Shift Note    Bedside shift change report given to Myrna MORRISSEY (oncoming nurse) by Simran Covarrubias LPN (offgoing nurse).  Report included the following information SBAR, MAR, and Recent Results    Shift worked:  [id number]     Shift summary and any significant changes:     Patent complained of headache, nurse administered PRN pain meds. Patent was satisfied. Daughter and grandchildren came to visit. Nurse got patients home meds added to MAR.      Concerns for physician to address:  None during shift      Zone phone for oncoming shift:   [phone]       Activity:     Number times ambulated in hallways past shift: 0  Number of times OOB to chair past shift: 0    Cardiac:   Cardiac Monitoring: Yes           Access:  Current line(s): PIV     Genitourinary:   Urinary status: voiding    Respiratory:      Chronic home O2 use?: YES  Incentive spirometer at bedside: YES       GI:     Current diet:  ADULT DIET; Regular; 5 carb choices (75 gm/meal)  Passing flatus: YES  Tolerating current diet: YES       Pain Management:   Patient states pain is manageable on current regimen: YES    Skin:     Interventions: increase time out of bed    Patient Safety:  Fall Score:    Interventions: gripper socks       Length of Stay:  Expected LOS: 3  Actual LOS: 1      Simran Covarrubias LPN

## 2024-04-18 NOTE — CARE COORDINATION
Care Management Initial Assessment       RUR: 12%  Readmission? No  1st IM letter given? Yes   1st  letter given: No     Chart reviewed. Assessment completed at bedside. Introduced self and role as CM. Verified contact information and demographics. Pt lives with family in a two level home with 3 FELIPE. Pt is typically independent and ambulatory without a device. No prior hx of SNF/rehab or HHC reported. Preferred pharmacy is [facility]. Pt is considering completing ACP in near future. Family to transport home. He is not on home oxygen, but has a home nebulizer. Will continue to follow.       04/18/24 1611   Service Assessment   Patient Orientation Alert and Oriented   Cognition Alert   History Provided By Patient   Primary Caregiver Self   Support Systems Children;Spouse/Significant Other;Family Members   Patient's Healthcare Decision Maker is: Legal Next of Kin   PCP Verified by CM Yes   Last Visit to PCP Within last 3 months   Prior Functional Level Independent in ADLs/IADLs   Current Functional Level Independent in ADLs/IADLs   Can patient return to prior living arrangement Yes   Ability to make needs known: Good   Family able to assist with home care needs: Yes   Would you like for me to discuss the discharge plan with any other family members/significant others, and if so, who? Yes  (daughter (Palak))   Financial Resources Medicare   Social/Functional History   Lives With Spouse;Daughter   Type of Home House   Home Layout Two level   Home Access Stairs to enter with rails   Entrance Stairs - Number of Steps 3   Home Equipment None   Receives Help From Family   ADL Assistance Independent   Homemaking Assistance Independent   Ambulation Assistance Independent   Transfer Assistance Independent   Active  Yes   Occupation Retired   Discharge Planning   Type of Residence House   Living Arrangements Spouse/Significant Other;Children   Current Services Prior To Admission Durable Medical Equipment

## 2024-04-18 NOTE — PLAN OF CARE
Problem: Respiratory - Adult  Goal: Achieves optimal ventilation and oxygenation  4/17/2024 2305 by Myrna Terrazas RN  Outcome: Progressing  4/17/2024 1649 by Aisha Andrews, RT  Outcome: Progressing     Problem: Discharge Planning  Goal: Discharge to home or other facility with appropriate resources  4/17/2024 2305 by Myrna Terrazas RN  Outcome: Progressing  4/17/2024 1130 by Roxy Beavers RN  Outcome: Progressing

## 2024-04-18 NOTE — PROGRESS NOTES
Hospitalist Progress Note    NAME:   Rodriguez Vergara   : 1945   MRN: [id number]     Date/Time: 2024    Patient PCP: Lidia Walton MD      Assessment / Plan:      Acute COPD exacerbation:  CXR : stable finding, no pneumonia  Iv steroids  Duonebs scheduled, added dulera, will likely need prescription for this on discharge  Azithromycin course  Check rapid covid and flu, procalcitonin  Currently on 2 liters oxygen, wean as tolerated  Counselled on smoking cessation     Elevated blood glucose:  Likely d/t steroids, no hx of DM  Check hba1c  Sliding scale for now     CAD:  Continue baby aspirin     HTN:  Takes terazosin at home, switched to formulary substitute Cardura here     Hyperlipidemia:  Continue statin    Anemia  Thrombocytopenia  No bleeding  Cont to monitor      Active smoker:  Counselled on cessation    Medical Decision Making:   I personally reviewed labs: yes, as below   I personally reviewed imaging reports: yes, as below   Toxic drug monitoring:   Discussed case with: Pt, RN, d/w CM for placement, family  Code Status: Full Code       Subjective:  Assessment / Plan:      Cough  Sore abd and chest from coughing   Afebrile   Discussed with RN events overnight.       Objective:      VITALS:   Last 24hrs VS reviewed since prior progress note. Most recent are:  Patient Vitals for the past 24 hrs:   BP Temp Temp src Pulse Resp SpO2   24 0808 -- -- -- -- -- 96 %   24 0334 (!) 122/57 98.1 °F (36.7 °C) Oral 56 16 99 %   24 2338 (!) 108/53 98.2 °F (36.8 °C) -- 57 16 93 %   24 2337 -- -- -- 57 -- 95 %   24 2147 (!) 106/55 98.2 °F (36.8 °C) Oral 56 17 99 %   24 2115 -- -- -- 57 18 99 %   24 2109 -- -- -- 57 18 99 %   24 1645 -- -- -- -- -- 99 %   24 1603 121/66 97.9 °F (36.6 °C) Oral 55 20 97 %   24 1030 117/70 97.7 °F (36.5 °C) Oral 57 20 100 %   24 1000 131/67 -- -- 55 19 96 %       No intake or output data in the 24 hours ending

## 2024-04-19 PROCEDURE — 6370000000 HC RX 637 (ALT 250 FOR IP): Performed by: STUDENT IN AN ORGANIZED HEALTH CARE EDUCATION/TRAINING PROGRAM

## 2024-04-19 PROCEDURE — 2580000003 HC RX 258: Performed by: STUDENT IN AN ORGANIZED HEALTH CARE EDUCATION/TRAINING PROGRAM

## 2024-04-19 PROCEDURE — 94010 BREATHING CAPACITY TEST: CPT

## 2024-04-19 PROCEDURE — 2700000000 HC OXYGEN THERAPY PER DAY

## 2024-04-19 PROCEDURE — 1100000003 HC PRIVATE W/ TELEMETRY

## 2024-04-19 PROCEDURE — 94761 N-INVAS EAR/PLS OXIMETRY MLT: CPT

## 2024-04-19 PROCEDURE — 6360000002 HC RX W HCPCS: Performed by: STUDENT IN AN ORGANIZED HEALTH CARE EDUCATION/TRAINING PROGRAM

## 2024-04-19 PROCEDURE — 6370000000 HC RX 637 (ALT 250 FOR IP): Performed by: INTERNAL MEDICINE

## 2024-04-19 PROCEDURE — 94760 N-INVAS EAR/PLS OXIMETRY 1: CPT

## 2024-04-19 PROCEDURE — 6370000000 HC RX 637 (ALT 250 FOR IP): Performed by: GENERAL ACUTE CARE HOSPITAL

## 2024-04-19 PROCEDURE — G0238 OTH RESP PROC, INDIV: HCPCS

## 2024-04-19 PROCEDURE — 94640 AIRWAY INHALATION TREATMENT: CPT

## 2024-04-19 RX ORDER — CETIRIZINE HYDROCHLORIDE 10 MG/1
5 TABLET ORAL DAILY
Status: DISCONTINUED | OUTPATIENT
Start: 2024-04-19 | End: 2024-04-21 | Stop reason: HOSPADM

## 2024-04-19 RX ADMIN — WATER 60 MG: 1 INJECTION INTRAMUSCULAR; INTRAVENOUS; SUBCUTANEOUS at 15:16

## 2024-04-19 RX ADMIN — WATER 60 MG: 1 INJECTION INTRAMUSCULAR; INTRAVENOUS; SUBCUTANEOUS at 21:12

## 2024-04-19 RX ADMIN — MOMETASONE FUROATE AND FORMOTEROL FUMARATE DIHYDRATE 2 PUFF: 100; 5 AEROSOL RESPIRATORY (INHALATION) at 20:36

## 2024-04-19 RX ADMIN — SODIUM CHLORIDE 25 ML: 9 INJECTION, SOLUTION INTRAVENOUS at 12:00

## 2024-04-19 RX ADMIN — METOPROLOL TARTRATE 25 MG: 25 TABLET, FILM COATED ORAL at 09:40

## 2024-04-19 RX ADMIN — IPRATROPIUM BROMIDE AND ALBUTEROL SULFATE 1 DOSE: .5; 3 SOLUTION RESPIRATORY (INHALATION) at 11:43

## 2024-04-19 RX ADMIN — ASPIRIN 81 MG: 81 TABLET, COATED ORAL at 09:37

## 2024-04-19 RX ADMIN — ENOXAPARIN SODIUM 40 MG: 100 INJECTION SUBCUTANEOUS at 09:41

## 2024-04-19 RX ADMIN — GUAIFENESIN 600 MG: 600 TABLET, EXTENDED RELEASE ORAL at 09:39

## 2024-04-19 RX ADMIN — IPRATROPIUM BROMIDE AND ALBUTEROL SULFATE 1 DOSE: .5; 3 SOLUTION RESPIRATORY (INHALATION) at 07:54

## 2024-04-19 RX ADMIN — GUAIFENESIN AND CODEINE PHOSPHATE 5 ML: 100; 10 SOLUTION ORAL at 11:57

## 2024-04-19 RX ADMIN — CETIRIZINE HYDROCHLORIDE 5 MG: 10 TABLET, FILM COATED ORAL at 15:17

## 2024-04-19 RX ADMIN — AZITHROMYCIN MONOHYDRATE 500 MG: 500 INJECTION, POWDER, LYOPHILIZED, FOR SOLUTION INTRAVENOUS at 12:10

## 2024-04-19 RX ADMIN — WATER 60 MG: 1 INJECTION INTRAMUSCULAR; INTRAVENOUS; SUBCUTANEOUS at 05:26

## 2024-04-19 RX ADMIN — SODIUM CHLORIDE, PRESERVATIVE FREE 10 ML: 5 INJECTION INTRAVENOUS at 09:45

## 2024-04-19 RX ADMIN — IPRATROPIUM BROMIDE AND ALBUTEROL SULFATE 1 DOSE: .5; 3 SOLUTION RESPIRATORY (INHALATION) at 16:01

## 2024-04-19 RX ADMIN — METOPROLOL TARTRATE 25 MG: 25 TABLET, FILM COATED ORAL at 21:12

## 2024-04-19 RX ADMIN — PRAVASTATIN SODIUM 10 MG: 10 TABLET ORAL at 21:12

## 2024-04-19 RX ADMIN — DOXAZOSIN 4 MG: 2 TABLET ORAL at 09:38

## 2024-04-19 RX ADMIN — SODIUM CHLORIDE, PRESERVATIVE FREE 10 ML: 5 INJECTION INTRAVENOUS at 21:13

## 2024-04-19 RX ADMIN — IPRATROPIUM BROMIDE AND ALBUTEROL SULFATE 1 DOSE: .5; 3 SOLUTION RESPIRATORY (INHALATION) at 20:37

## 2024-04-19 RX ADMIN — SODIUM CHLORIDE, PRESERVATIVE FREE 10 ML: 5 INJECTION INTRAVENOUS at 11:58

## 2024-04-19 RX ADMIN — GUAIFENESIN 600 MG: 600 TABLET, EXTENDED RELEASE ORAL at 21:12

## 2024-04-19 ASSESSMENT — PULMONARY FUNCTION TESTS
FEV1 (%PREDICTED): 80
PIF_VALUE: 80
POST BRONCHODILATOR FEV1/FVC: 61

## 2024-04-19 ASSESSMENT — COPD QUESTIONNAIRES
QUESTION4_WALKINCLINE: 0
QUESTION1_COUGHFREQUENCY: 3
TOTAL_EXACERBATIONS_PASTYEAR: 2
QUESTION3_CHESTTIGHTNESS: 0
QUESTION5_HOMEACTIVITIES: 0
QUESTION2_CHESTPHLEGM: 2
CAT_TOTALSCORE: 5
QUESTION6_LEAVINGHOUSE: 0
GOLD_GRADE: 1
QUESTION7_SLEEPQUALITY: 0
QUESTION8_ENERGYLEVEL: 0
GOLD_GROUP: GROUP A

## 2024-04-19 ASSESSMENT — PAIN SCALES - GENERAL
PAINLEVEL_OUTOF10: 0

## 2024-04-19 NOTE — PROGRESS NOTES
.Bedside, Verbal, Recorded, and Written shift change report given to Brandie rn (oncoming nurse) by Sarah rn (offgoing nurse). Report included the following information Nurse Handoff Report, ED SBAR, Adult Overview, Intake/Output, MAR, Neuro Assessment, and Event Log.

## 2024-04-19 NOTE — PLAN OF CARE
Problem: Discharge Planning  Goal: Discharge to home or other facility with appropriate resources  Outcome: Progressing     Problem: Respiratory - Adult  Goal: Achieves optimal ventilation and oxygenation  4/19/2024 1119 by Sarah Nash RN  Outcome: Progressing  4/18/2024 2125 by Suzanne Ewing RCP  Outcome: Progressing     Problem: Safety - Adult  Goal: Free from fall injury  Outcome: Progressing

## 2024-04-19 NOTE — CONSULTS
UNLISTED PROCEDURE CARDIAC SURGERY      pacemaker/defibrillator      Social History     Tobacco Use    Smoking status: Former     Current packs/day: 0.00     Types: Cigarettes     Quit date: 2016     Years since quittin.1    Smokeless tobacco: Not on file    Tobacco comments:     Quit smoking: quit smoking cigarettes 1 1/2 yrs ago   Substance Use Topics    Alcohol use: No      Family History   Problem Relation Age of Onset    Asthma Mother          ROS:Pertinent items are noted in HPI.    Objective:     Vital Signs: Telemetry:     Intake/Output:   BP (!) 105/55   Pulse 56   Temp 98.1 °F (36.7 °C) (Oral)   Resp 18   Ht 1.676 m (5' 6\")   Wt 64.9 kg (143 lb 1.3 oz)   SpO2 92%   BMI 23.09 kg/m²     Temp (24hrs), Av.9 °F (36.6 °C), Min:97.3 °F (36.3 °C), Max:98.2 °F (36.8 °C)                  Wt Readings from Last 4 Encounters:   24 64.9 kg (143 lb 1.3 oz)   24 65.7 kg (144 lb 13.5 oz)   24 68 kg (150 lb)   24 64.5 kg (142 lb 3.2 oz)       Patient Vitals for the past 96 hrs (Last 3 readings):   Weight   24 0445 64.9 kg (143 lb 1.3 oz)       PF Readings from Last 3 Encounters:   No data found for PF      Intake/Output Summary (Last 24 hours) at 2024 1408  Last data filed at 2024 1035  Gross per 24 hour   Intake 300 ml   Output --   Net 300 ml       Last shift:       07 -  1900  In: 300 [P.O.:300]  Out: -   Last 3 shifts: No intake/output data recorded.       Physical Exam:   General: Awake, in no distress;  male; moderately ill;    HEENT: NCAT, fairdentition, lips and mucosa dry. crowded oropharynx, no thrush  Eyes: anicteric; conjunctiva clear  Neck: no nodes or obvious goiter, no accessory MM use,   Chest/ Thorax: barrle chested  Cardiac: regular rate, rhythm; no murmur; no tachycardia  Lungs: Diminished breath sounds; scattered wheezes, no rales,  no rhonchi,   Abd: soft, NT, hypoactive BS,   MS/Ext: no edema; no joint swelling; No

## 2024-04-19 NOTE — PROGRESS NOTES
End of Shift Note    Bedside shift change report given to Sarah MORRISSEY (oncoming nurse) by Myrna Terrazas RN (offgoing nurse).  Report included the following information SBAR, MAR, and Recent Results    Shift worked:  [id number]     Shift summary and any significant changes:     PRN cough syrup with good results.No complaints of pain.     Concerns for physician to address:  None during shift      Zone phone for oncoming shift:   [phone]       Activity:     Number times ambulated in hallways past shift: 0  Number of times OOB to chair past shift: 0    Cardiac:   Cardiac Monitoring: Yes           Access:  Current line(s): PIV     Genitourinary:   Urinary status: voiding    Respiratory:      Chronic home O2 use?: YES  Incentive spirometer at bedside: YES       GI:     Current diet:  ADULT DIET; Regular; 5 carb choices (75 gm/meal)  Passing flatus: YES  Tolerating current diet: YES       Pain Management:   Patient states pain is manageable on current regimen: YES    Skin:     Interventions: increase time out of bed    Patient Safety:  Fall Score:    Interventions: gripper socks       Length of Stay:  Expected LOS: 3  Actual LOS: 2      Myrna Terrazas RN

## 2024-04-19 NOTE — PROGRESS NOTES
Hospital follow-up PCP transitional care appointment has been scheduled with Dr. Lidia Walton on 4/25/24 8567. This is a previously scheduled appt and still first available. [facility] placed [facility] information AVS for patient resource. Pending patient discharge.  Danelle Stafford, Care Management Assistant

## 2024-04-19 NOTE — PROGRESS NOTES
Pulmonary Disease Navigator Note  [facility]    Current GOLD classification for Rodriguez Vergara    Patient's chart was reviewed by Pulmonary Disease Navigator for compliance with prescribed treatment with Global Initiative For Chronic Obstructive Lung Disease (GOLD).    Please, review beneath recommendations for pharmacological treatment for patient with obstructive lung disease.     Patient advised that he had increased coughing and shortness of breath which brought him to the hosptial.      Current Pharmacological Treatment:  albuterol sulfate HFA (PROVENTIL;VENTOLIN;PROAIR) 108 (90 Base) MCG/ACT inhaler albuterol sulfate HFA (PROVENTIL;VENTOLIN;PROAIR) 108 (90 Base) MCG/ACT inhaler;  ipratropium 0.5 mg-albuterol 2.5 mg (DUONEB) 0.5-2.5 (3) MG/3ML SOLN nebulizer solution;         GOLD Stage:  Requires PFT documentation  Group: Group A  Current eosinophil count: 0  Recorded domestic exacerbations past 12 months: 2        Observed PIF: 80 L/min (In check Dial)    Combination  ICS-LABA Inhaler Acceptable   Therapy  Device For Use   Salmeterol/fluticasone Advair  Diskus    Vilanterol/fluticasone  Breo  Ellipta    Formoterol/mometasone  Dulera MDI Yes   Formoterol/budesonide  Symbicort MDI Yes   Triple Therapy Recommended (For Group E) [id number]     Fluticasone/umeclidinium/vilanterol  Trelegy  Ellipta    Budesonide/glycopyrrolate/formoterol fumarate  Breztri  MDI Yes   Recommended (For Group A & B) LAMA/LABA     Vilanterol/umeclidinium  Anoro  Ellipta    Olodaterol/tiotropium  Stiolto  Respimat Yes   Formoterol/glycopyrronium  Bevespi  MDI Yes   Aclidinium/formoterol Duaklir  Pressair      *Nebulizer Options    LAMA LABA ICS   Yupelri  Brovanaliya Budesonide    Performist      The CAT provides a reliable measure of the impact of COPD on a patient's health status. Range of CAT scores from 0-40. Higher scores denote a more severe impact of COPD on a patient’s life. Scores <10 have a

## 2024-04-19 NOTE — PROGRESS NOTES
Hospitalist Progress Note    NAME:   Rodriguez Vergara   : 1945   MRN: [id number]     Date/Time: 2024    Patient PCP: Lidia Walton MD      Assessment / Plan:      Acute COPD exacerbation:  Counselled on smoking cessation  CXR : stable finding, no pneumonia  Iv steroids  Duonebs scheduled, added dulera, will likely need prescription for this on discharge  Azithromycin course  Neg rapid covid and flu  On RA at rest on   Obtain Home O2 challenge prior to DC  Still wheezing and coughing, will monitor over night and re assess in AM  DC in 1-2 days      Elevated blood glucose:  Likely d/t steroids, no hx of DM  Check hba1c  Sliding scale for now     CAD:  Continue baby aspirin     HTN:  Takes terazosin at home, switched to formulary substitute Cardura here     Hyperlipidemia:  Continue statin    Anemia  Thrombocytopenia  No bleeding  Cont to monitor      Active smoker:  Counselled on cessation    Medical Decision Making:   I personally reviewed labs: yes, as below   I personally reviewed imaging reports: yes, as below   Toxic drug monitoring:   Discussed case with: Pt, RN, d/w CM for placement  Code Status: Full Code       Subjective:  Assessment / Plan:      Cough  SOB  Sore abd and chest from coughing   Afebrile   Discussed with RN events overnight.       Objective:      VITALS:   Last 24hrs VS reviewed since prior progress note. Most recent are:  Patient Vitals for the past 24 hrs:   BP Temp Temp src Pulse Resp SpO2   24 1411 110/61 98.4 °F (36.9 °C) Oral 55 24 93 %   24 1035 (!) 105/55 98.1 °F (36.7 °C) Oral 56 18 92 %   24 0756 -- -- -- -- -- 94 %   24 0736 (!) 126/54 98.2 °F (36.8 °C) Oral 58 19 99 %   24 0019 (!) 122/58 98.1 °F (36.7 °C) Oral 55 17 100 %   24 1936 (!) 115/53 97.3 °F (36.3 °C) Oral 72 17 91 %           Intake/Output Summary (Last 24 hours) at 2024 1721  Last data filed at 2024 1035  Gross per 24 hour   Intake 300 ml   Output --

## 2024-04-20 LAB
ERYTHROCYTE [DISTWIDTH] IN BLOOD BY AUTOMATED COUNT: 14.5 % (ref 11.5–14.5)
HCT VFR BLD AUTO: 36 % (ref 36.6–50.3)
HGB BLD-MCNC: 11.2 G/DL (ref 12.1–17)
MCH RBC QN AUTO: 29.8 PG (ref 26–34)
MCHC RBC AUTO-ENTMCNC: 31.1 G/DL (ref 30–36.5)
MCV RBC AUTO: 95.7 FL (ref 80–99)
NRBC # BLD: 0 K/UL (ref 0–0.01)
NRBC BLD-RTO: 0 PER 100 WBC
PLATELET # BLD AUTO: 118 K/UL (ref 150–400)
PMV BLD AUTO: 10.1 FL (ref 8.9–12.9)
RBC # BLD AUTO: 3.76 M/UL (ref 4.1–5.7)
WBC # BLD AUTO: 7.2 K/UL (ref 4.1–11.1)

## 2024-04-20 PROCEDURE — 6370000000 HC RX 637 (ALT 250 FOR IP): Performed by: STUDENT IN AN ORGANIZED HEALTH CARE EDUCATION/TRAINING PROGRAM

## 2024-04-20 PROCEDURE — 6360000002 HC RX W HCPCS: Performed by: STUDENT IN AN ORGANIZED HEALTH CARE EDUCATION/TRAINING PROGRAM

## 2024-04-20 PROCEDURE — 1100000003 HC PRIVATE W/ TELEMETRY

## 2024-04-20 PROCEDURE — 36415 COLL VENOUS BLD VENIPUNCTURE: CPT

## 2024-04-20 PROCEDURE — 94640 AIRWAY INHALATION TREATMENT: CPT

## 2024-04-20 PROCEDURE — 6370000000 HC RX 637 (ALT 250 FOR IP): Performed by: INTERNAL MEDICINE

## 2024-04-20 PROCEDURE — 2580000003 HC RX 258: Performed by: STUDENT IN AN ORGANIZED HEALTH CARE EDUCATION/TRAINING PROGRAM

## 2024-04-20 PROCEDURE — 6370000000 HC RX 637 (ALT 250 FOR IP): Performed by: GENERAL ACUTE CARE HOSPITAL

## 2024-04-20 PROCEDURE — 85027 COMPLETE CBC AUTOMATED: CPT

## 2024-04-20 RX ADMIN — SODIUM CHLORIDE, PRESERVATIVE FREE 10 ML: 5 INJECTION INTRAVENOUS at 08:40

## 2024-04-20 RX ADMIN — DOXAZOSIN 4 MG: 2 TABLET ORAL at 08:37

## 2024-04-20 RX ADMIN — AZITHROMYCIN MONOHYDRATE 500 MG: 500 INJECTION, POWDER, LYOPHILIZED, FOR SOLUTION INTRAVENOUS at 12:22

## 2024-04-20 RX ADMIN — MOMETASONE FUROATE AND FORMOTEROL FUMARATE DIHYDRATE 2 PUFF: 100; 5 AEROSOL RESPIRATORY (INHALATION) at 08:17

## 2024-04-20 RX ADMIN — IPRATROPIUM BROMIDE AND ALBUTEROL SULFATE 1 DOSE: .5; 3 SOLUTION RESPIRATORY (INHALATION) at 15:04

## 2024-04-20 RX ADMIN — ENOXAPARIN SODIUM 40 MG: 100 INJECTION SUBCUTANEOUS at 08:37

## 2024-04-20 RX ADMIN — CETIRIZINE HYDROCHLORIDE 5 MG: 10 TABLET, FILM COATED ORAL at 08:37

## 2024-04-20 RX ADMIN — PRAVASTATIN SODIUM 10 MG: 10 TABLET ORAL at 20:03

## 2024-04-20 RX ADMIN — IPRATROPIUM BROMIDE AND ALBUTEROL SULFATE 1 DOSE: .5; 3 SOLUTION RESPIRATORY (INHALATION) at 11:18

## 2024-04-20 RX ADMIN — METOPROLOL TARTRATE 25 MG: 25 TABLET, FILM COATED ORAL at 20:03

## 2024-04-20 RX ADMIN — IPRATROPIUM BROMIDE AND ALBUTEROL SULFATE 1 DOSE: .5; 3 SOLUTION RESPIRATORY (INHALATION) at 08:17

## 2024-04-20 RX ADMIN — SODIUM CHLORIDE, PRESERVATIVE FREE 10 ML: 5 INJECTION INTRAVENOUS at 20:03

## 2024-04-20 RX ADMIN — WATER 60 MG: 1 INJECTION INTRAMUSCULAR; INTRAVENOUS; SUBCUTANEOUS at 05:36

## 2024-04-20 RX ADMIN — GUAIFENESIN 600 MG: 600 TABLET, EXTENDED RELEASE ORAL at 08:39

## 2024-04-20 RX ADMIN — WATER 60 MG: 1 INJECTION INTRAMUSCULAR; INTRAVENOUS; SUBCUTANEOUS at 13:30

## 2024-04-20 RX ADMIN — GUAIFENESIN AND CODEINE PHOSPHATE 5 ML: 100; 10 SOLUTION ORAL at 12:19

## 2024-04-20 RX ADMIN — GUAIFENESIN 600 MG: 600 TABLET, EXTENDED RELEASE ORAL at 20:03

## 2024-04-20 RX ADMIN — IPRATROPIUM BROMIDE AND ALBUTEROL SULFATE 1 DOSE: .5; 3 SOLUTION RESPIRATORY (INHALATION) at 19:51

## 2024-04-20 RX ADMIN — MOMETASONE FUROATE AND FORMOTEROL FUMARATE DIHYDRATE 2 PUFF: 100; 5 AEROSOL RESPIRATORY (INHALATION) at 19:51

## 2024-04-20 RX ADMIN — ASPIRIN 81 MG: 81 TABLET, COATED ORAL at 08:37

## 2024-04-20 RX ADMIN — WATER 60 MG: 1 INJECTION INTRAMUSCULAR; INTRAVENOUS; SUBCUTANEOUS at 21:32

## 2024-04-20 RX ADMIN — GUAIFENESIN AND CODEINE PHOSPHATE 5 ML: 100; 10 SOLUTION ORAL at 23:00

## 2024-04-20 ASSESSMENT — PAIN SCALES - GENERAL
PAINLEVEL_OUTOF10: 0
PAINLEVEL_OUTOF10: 0

## 2024-04-20 NOTE — PLAN OF CARE
Problem: Discharge Planning  Goal: Discharge to home or other facility with appropriate resources  4/19/2024 2339 by Ivan Zaidi, RN  Outcome: Progressing  Flowsheets (Taken 4/19/2024 2330)  Discharge to home or other facility with appropriate resources:   Identify barriers to discharge with patient and caregiver   Arrange for needed discharge resources and transportation as appropriate   Identify discharge learning needs (meds, wound care, etc)   Arrange for interpreters to assist at discharge as needed   Refer to discharge planning if patient needs post-hospital services based on physician order or complex needs related to functional status, cognitive ability or social support system  4/19/2024 1119 by Sarah Nash, RN  Outcome: Progressing     Problem: Respiratory - Adult  Goal: Achieves optimal ventilation and oxygenation  4/19/2024 2339 by Ivan Zaidi, RN  Outcome: Progressing  4/19/2024 1119 by Sarah Nash, RN  Outcome: Progressing     Problem: Safety - Adult  Goal: Free from fall injury  4/19/2024 2339 by Ivan Zaidi, RN  Outcome: Progressing  4/19/2024 1119 by Sarah Nash, RN  Outcome: Progressing

## 2024-04-21 VITALS
HEART RATE: 52 BPM | DIASTOLIC BLOOD PRESSURE: 65 MMHG | RESPIRATION RATE: 16 BRPM | BODY MASS INDEX: 22.99 KG/M2 | TEMPERATURE: 97.9 F | HEIGHT: 66 IN | SYSTOLIC BLOOD PRESSURE: 143 MMHG | WEIGHT: 143.08 LBS | OXYGEN SATURATION: 100 %

## 2024-04-21 PROCEDURE — 2580000003 HC RX 258: Performed by: STUDENT IN AN ORGANIZED HEALTH CARE EDUCATION/TRAINING PROGRAM

## 2024-04-21 PROCEDURE — 94640 AIRWAY INHALATION TREATMENT: CPT

## 2024-04-21 PROCEDURE — 6360000002 HC RX W HCPCS: Performed by: STUDENT IN AN ORGANIZED HEALTH CARE EDUCATION/TRAINING PROGRAM

## 2024-04-21 PROCEDURE — 6370000000 HC RX 637 (ALT 250 FOR IP): Performed by: GENERAL ACUTE CARE HOSPITAL

## 2024-04-21 PROCEDURE — 6370000000 HC RX 637 (ALT 250 FOR IP): Performed by: INTERNAL MEDICINE

## 2024-04-21 PROCEDURE — 6370000000 HC RX 637 (ALT 250 FOR IP): Performed by: STUDENT IN AN ORGANIZED HEALTH CARE EDUCATION/TRAINING PROGRAM

## 2024-04-21 RX ORDER — FLUTICASONE FUROATE, UMECLIDINIUM BROMIDE AND VILANTEROL TRIFENATATE 200; 62.5; 25 UG/1; UG/1; UG/1
1 POWDER RESPIRATORY (INHALATION) DAILY
Qty: 1 EACH | Refills: 1 | Status: SHIPPED | OUTPATIENT
Start: 2024-04-21

## 2024-04-21 RX ORDER — PREDNISONE 20 MG/1
40 TABLET ORAL DAILY
Qty: 10 TABLET | Refills: 0 | Status: SHIPPED | OUTPATIENT
Start: 2024-04-21 | End: 2024-04-26

## 2024-04-21 RX ADMIN — CETIRIZINE HYDROCHLORIDE 5 MG: 10 TABLET, FILM COATED ORAL at 09:38

## 2024-04-21 RX ADMIN — ASPIRIN 81 MG: 81 TABLET, COATED ORAL at 09:38

## 2024-04-21 RX ADMIN — GUAIFENESIN AND CODEINE PHOSPHATE 5 ML: 100; 10 SOLUTION ORAL at 05:21

## 2024-04-21 RX ADMIN — IPRATROPIUM BROMIDE AND ALBUTEROL SULFATE 1 DOSE: .5; 3 SOLUTION RESPIRATORY (INHALATION) at 12:03

## 2024-04-21 RX ADMIN — ENOXAPARIN SODIUM 40 MG: 100 INJECTION SUBCUTANEOUS at 09:38

## 2024-04-21 RX ADMIN — MOMETASONE FUROATE AND FORMOTEROL FUMARATE DIHYDRATE 2 PUFF: 100; 5 AEROSOL RESPIRATORY (INHALATION) at 07:54

## 2024-04-21 RX ADMIN — SODIUM CHLORIDE, PRESERVATIVE FREE 10 ML: 5 INJECTION INTRAVENOUS at 09:40

## 2024-04-21 RX ADMIN — DOXAZOSIN 4 MG: 2 TABLET ORAL at 09:38

## 2024-04-21 RX ADMIN — GUAIFENESIN 600 MG: 600 TABLET, EXTENDED RELEASE ORAL at 09:38

## 2024-04-21 RX ADMIN — WATER 60 MG: 1 INJECTION INTRAMUSCULAR; INTRAVENOUS; SUBCUTANEOUS at 05:21

## 2024-04-21 RX ADMIN — IPRATROPIUM BROMIDE AND ALBUTEROL SULFATE 1 DOSE: .5; 3 SOLUTION RESPIRATORY (INHALATION) at 07:53

## 2024-04-21 NOTE — DISCHARGE SUMMARY
Discharge Summary    Name: Rodriguez Vergara  [id number]  YOB: 1945 (Age: 78 y.o.)   Date of Admission: 4/17/2024  Date of Discharge: 4/21/2024  Attending Physician: Osei Hartley MD    Discharge Diagnosis:     Acute COPD exacerbation:  Elevated blood glucose due to steroids with normal A1c  CAD:  HTN:  Hyperlipidemia:  Anemia  Thrombocytopenia for outpatient follow-up with PCP  Active smoker:    Consultations:  IP CONSULT TO HOSPITALIST  IP CONSULT TO PULMONOLOGY      Brief Admission History/Reason for Admission Per Shane Johnson MD:   Rodriguez Vergara is a 78 y.o.  male with PMHx significant as below presented with worsening shortness of breath overnight. He reports having shortness of breath for last few weeks, was prescrbied steroids by his PMD 12 days back and still taking it. However over the night he worsening shortness of breath after coming back from restroom and his daughter brought him to ED. He was also having some wheezing. He denies any chest pain. He reports having soreness in abdomen from the coughing. He denies any fever, chill. He has a productive cough. He denies any lower ext edema, tenderness, change in urinary or bowel habits     Brief Hospital Course by Main Problems:     Patient was admitted hospital noted acute COPD exacerbation.  Pulmonary was consulted.  Patient was started on IV Solu-Medrol, DuoNeb and also azithromycin.  COVID and flu were negative.  Oxygen challenge was negative.  He was cleared by pulmonary for discharge.  Pulmonary recommended Trelegy at discharge.  Patient was also noted to have elevated blood glucose and A1c was checked which was within normal limits.  The rest of the medical problems remained stable during hospitalization.    Patient seen and examined today, vital signs are stable, lab work is at baseline and is being released in much improved condition for outpatient follow-up.  He is advised about smoking

## 2024-04-21 NOTE — PROGRESS NOTES
4/21/2024        RE: Rodriguez Vergara         [address] [place] [id number]          To Whom It May Concern,      Due to medical reasons, Rodriguez Vergara may  Return to work on 4/24.24.        Sincerely,          Osei Hartley MD

## 2024-04-21 NOTE — DISCHARGE INSTRUCTIONS
Patient Discharge Instructions    Rodriguez Vergara / [id number] : 1945    Admitted 2024 Discharged: 2024         DISCHARGE DIAGNOSIS:   Acute COPD exacerbation:  Elevated blood glucose:  CAD:  HTN:  Hyperlipidemia:  Anemia  Thrombocytopenia  Active smoker:      Take Home Medications     {Medication reconciliation information is now added to the patient's AVS automatically when it is printed.  There is no need to use this SmartLink in discharge instructions.  Highlight this text and delete it to clear this message}      General drug facts     If you have a very bad allergy, wear an allergy ID at all times.   It is important that you take the medication exactly as they are prescribed.   Keep your medication in the bottles provided by the pharmacist.  Keep a list of all your drugs (prescription, natural products, vitamins, OTC) with you. Give this list to your doctor.  Do not take other medications without consulting your doctor.    Do not share your drugs with others and do not take anyone else's drugs.   Keep all drugs out of the reach of children and pets.    Most drugs may be thrown away in household trash after mixing with coffee grounds or dick litter and sealing in a plastic bag.    Keep a list Call your doctor for help with any side effects. If in the [place] you may also call the FDA at 4-236-NZA-9085    Talk with the doctor before starting any new drug, including OTC, natural products, or vitamins.        What to do at Home    1. Recommended diet: Low salt     2. Recommended activity: activity as tolerated    3. If you experience any of the following symptoms then please call your primary care physician or return to the emergency room if you cannot get hold of your doctor:    4. Wound Care: none     5. Lab work: cbc and bmp in 1 week     6. Stop smocking    7. Bring these papers with you to your follow up appointments. The papers will help your doctors be sure to continue the care plan from

## 2024-04-21 NOTE — CARE COORDINATION
D/c orders placed. Pt to d/c home by private vehicle with family. No PT/OT consults or needs. Confirmed with nursing pt successfully weaned off of O2. 2nd IM provided 4/19. PCP f/u appnt scheduled. All info entered into pt AVS.     Pt has no additional CM needs at this time     04/21/24 1256   Services At/After Discharge   Transition of Care Consult (CM Consult) N/A   Services At/After Discharge None   Mode of Transport at Discharge Other (see comment)  (Family)   Confirm Follow Up Transport Self   Condition of Participation: Discharge Planning   The Plan for Transition of Care is related to the following treatment goals: Follow-up Care Appointments     Syvlia Agosto LCSW  [facility] Care Manager  [phone]

## 2024-04-21 NOTE — PROGRESS NOTES
Patient discharged via wheelchair with spouse. IV removed. Instructions reviewed. All belongings with patient.

## 2024-04-30 ASSESSMENT — ENCOUNTER SYMPTOMS
ABDOMINAL PAIN: 0
CHEST TIGHTNESS: 1
COUGH: 1
SHORTNESS OF BREATH: 1
VOMITING: 0
NAUSEA: 0
WHEEZING: 1

## 2024-05-02 ENCOUNTER — OFFICE VISIT (OUTPATIENT)
Age: 79
End: 2024-05-02
Payer: MEDICARE

## 2024-05-02 VITALS
DIASTOLIC BLOOD PRESSURE: 48 MMHG | TEMPERATURE: 98.2 F | HEIGHT: 66 IN | BODY MASS INDEX: 23.53 KG/M2 | HEART RATE: 58 BPM | WEIGHT: 146.4 LBS | OXYGEN SATURATION: 87 % | SYSTOLIC BLOOD PRESSURE: 88 MMHG | RESPIRATION RATE: 16 BRPM

## 2024-05-02 DIAGNOSIS — R51.0 HEADACHE DUE TO LOW CEREBROSPINAL FLUID PRESSURE: Primary | ICD-10-CM

## 2024-05-02 DIAGNOSIS — G44.209 TENSION VASCULAR HEADACHE: ICD-10-CM

## 2024-05-02 PROBLEM — S09.90XA INJURY OF HEAD: Status: RESOLVED | Noted: 2018-09-08 | Resolved: 2024-05-02

## 2024-05-02 PROBLEM — I50.9 CONGESTIVE HEART FAILURE (HCC): Status: ACTIVE | Noted: 2019-11-10

## 2024-05-02 PROBLEM — J96.01 ACUTE RESPIRATORY FAILURE WITH HYPOXIA (HCC): Status: RESOLVED | Noted: 2021-10-25 | Resolved: 2024-05-02

## 2024-05-02 PROBLEM — S16.1XXA STRAIN OF NECK MUSCLE: Status: RESOLVED | Noted: 2019-11-17 | Resolved: 2024-05-02

## 2024-05-02 PROBLEM — J18.9 PNEUMONIA: Status: RESOLVED | Noted: 2019-04-19 | Resolved: 2024-05-02

## 2024-05-02 PROBLEM — S16.1XXA STRAIN OF NECK MUSCLE: Status: ACTIVE | Noted: 2019-11-17

## 2024-05-02 PROBLEM — J18.9 PNEUMONIA: Status: ACTIVE | Noted: 2019-04-19

## 2024-05-02 PROBLEM — R06.00 DYSPNEA: Status: ACTIVE | Noted: 2023-07-07

## 2024-05-02 PROBLEM — M54.30 SCIATICA: Status: ACTIVE | Noted: 2023-11-17

## 2024-05-02 PROBLEM — S09.90XA INJURY OF HEAD: Status: ACTIVE | Noted: 2018-09-08

## 2024-05-02 PROBLEM — Z95.0 PACEMAKER: Status: ACTIVE | Noted: 2024-05-02

## 2024-05-02 PROBLEM — Z86.010 PERSONAL HISTORY OF COLONIC POLYPS: Status: ACTIVE | Noted: 2019-01-21

## 2024-05-02 PROBLEM — Z86.0100 PERSONAL HISTORY OF COLONIC POLYPS: Status: ACTIVE | Noted: 2019-01-21

## 2024-05-02 PROBLEM — J44.1 COPD EXACERBATION (HCC): Status: RESOLVED | Noted: 2021-10-25 | Resolved: 2024-05-02

## 2024-05-02 PROCEDURE — G8420 CALC BMI NORM PARAMETERS: HCPCS | Performed by: PSYCHIATRY & NEUROLOGY

## 2024-05-02 PROCEDURE — 1123F ACP DISCUSS/DSCN MKR DOCD: CPT | Performed by: PSYCHIATRY & NEUROLOGY

## 2024-05-02 PROCEDURE — 1036F TOBACCO NON-USER: CPT | Performed by: PSYCHIATRY & NEUROLOGY

## 2024-05-02 PROCEDURE — 3078F DIAST BP <80 MM HG: CPT | Performed by: PSYCHIATRY & NEUROLOGY

## 2024-05-02 PROCEDURE — 3074F SYST BP LT 130 MM HG: CPT | Performed by: PSYCHIATRY & NEUROLOGY

## 2024-05-02 PROCEDURE — 99203 OFFICE O/P NEW LOW 30 MIN: CPT | Performed by: PSYCHIATRY & NEUROLOGY

## 2024-05-02 PROCEDURE — 1111F DSCHRG MED/CURRENT MED MERGE: CPT | Performed by: PSYCHIATRY & NEUROLOGY

## 2024-05-02 PROCEDURE — G8427 DOCREV CUR MEDS BY ELIG CLIN: HCPCS | Performed by: PSYCHIATRY & NEUROLOGY

## 2024-05-02 ASSESSMENT — PATIENT HEALTH QUESTIONNAIRE - PHQ9
2. FEELING DOWN, DEPRESSED OR HOPELESS: NOT AT ALL
SUM OF ALL RESPONSES TO PHQ9 QUESTIONS 1 & 2: 0
1. LITTLE INTEREST OR PLEASURE IN DOING THINGS: NOT AT ALL
SUM OF ALL RESPONSES TO PHQ QUESTIONS 1-9: 0

## 2024-05-02 NOTE — ASSESSMENT & PLAN NOTE
Patient had low pressure headaches due to CSF leak.  Blood patch was completed.  It took a little bit of time till patient's headaches resolved.   Patient was presenting with significant migraines with blurred vision and dizziness.  That is completely resolved    He is back to his baseline HAs of 1-2 a month takes a dose Tylenol HAs resolved

## 2024-05-02 NOTE — PATIENT INSTRUCTIONS
be out of the office or your message may require further review. We encourage you to use [employer] for your messages as this is a faster, more efficient way to communicate with our office    Medication Refills:  Prescription medications require up to 48 business hours to process. We encourage you to use [employer] for your refills.     For controlled medications: Please allow up to 72 business hours to process. Certain medications may require you to  a written prescription at our office.    NO narcotic/controlled medications will be prescribed after 4pm Monday through Friday or on weekends    Form/Paperwork Completion:  We ask that you allow 7-14 business days.  Forms can be downloaded to [employer] or you can have them faxed, mailed, or you can bring them in to our office directly.

## 2024-05-02 NOTE — PROGRESS NOTES
[facility]  [facility]  [address]  [place] [address]  Phone: [phone] fax: [phone]          Rodriguez Vergara is a 78 y.o. male who presents today for the following:  Chief Complaint   Patient presents with    New Patient     Patient states that he was having visual disturbance but not anymore.  No other issues to report.  Daughter states that patient just got out of the hospital for low bp and oxygen level.      Pacemaker    ASSESSMENT AND PLAN    1. Headache due to low cerebrospinal fluid pressure  Assessment & Plan:  Patient had low pressure headaches due to CSF leak.  Blood patch was completed.  It took a little bit of time till patient's headaches resolved.   Patient was presenting with significant migraines with blurred vision and dizziness.  That is completely resolved    He is back to his baseline HAs of 1-2 a month takes a dose Tylenol HAs resolved       2. Tension vascular headache  Assessment & Plan:  Frequency 1-2 times per month  Resolves with 1 dose of Tylenol  Patient does not need to be on any preventative or prescription rescue medicine at this time  Butalbital has been discontinued         Return if symptoms worsen or fail to improve.     Patient and/or family was given time to ask questions and voice concerns. I believe all questions concerns were adequately addressed at this  office visit.  Patient and/or family also verbalized agreement and understanding of the above-stated plan    Complex neurologic decision making secondary any or all of the following to include unclear etiology, and /or polypharmacy, and/or significant comorbid conditions, and/or use of high-risk medications which complicate the decision making process related to patient's neurologic diagnosis      ICD-10-CM    1. Headache due to low cerebrospinal fluid pressure  R51.0       2. Tension vascular headache  G44.209                HPI    Patient was

## 2024-05-02 NOTE — ASSESSMENT & PLAN NOTE
Frequency 1-2 times per month  Resolves with 1 dose of Tylenol  Patient does not need to be on any preventative or prescription rescue medicine at this time  Butalbital has been discontinued

## 2025-05-15 ENCOUNTER — TRANSCRIBE ORDERS (OUTPATIENT)
Facility: HOSPITAL | Age: 80
End: 2025-05-15

## 2025-05-15 DIAGNOSIS — M48.061 DEGENERATIVE LUMBAR SPINAL STENOSIS: ICD-10-CM

## 2025-05-15 DIAGNOSIS — M47.816 LUMBAR SPONDYLOSIS: ICD-10-CM

## 2025-05-15 DIAGNOSIS — M51.360 DISCOGENIC LOW BACK PAIN: Primary | ICD-10-CM

## 2025-05-15 DIAGNOSIS — M54.42 ACUTE BILATERAL LOW BACK PAIN WITH LEFT-SIDED SCIATICA: ICD-10-CM

## (undated) DEVICE — SET ADMIN 16ML TBNG L100IN 2 Y INJ SITE IV PIGGY BK DISP

## (undated) DEVICE — QUILTED PREMIUM COMFORT UNDERPAD,EXTRA HEAVY: Brand: WINGS

## (undated) DEVICE — 1200 GUARD II KIT W/5MM TUBE W/O VAC TUBE: Brand: GUARDIAN

## (undated) DEVICE — BAG BELONG PT PERS CLEAR HANDL

## (undated) DEVICE — ENDO CARRY-ON PROCEDURE KIT INCLUDES ENZYMATIC SPONGE, GAUZE, BIOHAZARD LABEL, TRAY, LUBRICANT, DIRTY SCOPE LABEL, WATER LABEL, TRAY, DRAWSTRING PAD, AND DEFENDO 4-PIECE KIT.: Brand: ENDO CARRY-ON PROCEDURE KIT

## (undated) DEVICE — CATH IV AUTOGRD BC BLU 22GA 25 -- INSYTE

## (undated) DEVICE — SNARE ENDOSCP DIA9MM SHTH DIA2.4MM CLD FOR POLYP EXACTO

## (undated) DEVICE — BW-412T DISP COMBO CLEANING BRUSH: Brand: SINGLE USE COMBINATION CLEANING BRUSH

## (undated) DEVICE — KENDALL RADIOLUCENT FOAM MONITORING ELECTRODE -RECTANGULAR SHAPE: Brand: KENDALL

## (undated) DEVICE — SOLIDIFIER FLUID 3000 CC ABSORB

## (undated) DEVICE — NEEDLE HYPO 18GA L1.5IN PNK S STL HUB POLYPR SHLD REG BVL

## (undated) DEVICE — CONNECTOR TBNG AUX H2O JET DISP FOR OLY 160/180 SER

## (undated) DEVICE — SYRINGE MED 20ML STD CLR PLAS LUERLOCK TIP N CTRL DISP

## (undated) DEVICE — TRAP SURG QUAD PARABOLA SLOT DSGN SFTY SCRN TRAPEASE

## (undated) DEVICE — FORCEPS BX L240CM JAW DIA2.4MM ORNG L CAP W/ NDL DISP RAD

## (undated) DEVICE — Device: Brand: MEDICAL ACTION INDUSTRIES

## (undated) DEVICE — Z DISCONTINUED USE 2751540 TUBING IRRIG L10IN DISP PMP ENDOGATOR

## (undated) DEVICE — AIRLIFE™ U/CONNECT-IT OXYGEN TUBING 7 FEET (2.1 M) CRUSH-RESISTANT OXYGEN TUBING, VINYL TIPPED: Brand: AIRLIFE™

## (undated) DEVICE — Z DISCONTINUED NO SUB IDED SET EXTN W/ 4 W STPCOCK M SPIN LOK 36IN